# Patient Record
Sex: MALE | Race: OTHER | HISPANIC OR LATINO | URBAN - METROPOLITAN AREA
[De-identification: names, ages, dates, MRNs, and addresses within clinical notes are randomized per-mention and may not be internally consistent; named-entity substitution may affect disease eponyms.]

---

## 2020-02-20 ENCOUNTER — INPATIENT (INPATIENT)
Facility: HOSPITAL | Age: 80
LOS: 5 days | Discharge: NURSING, REHAB OR LONG-TERM CARE | End: 2020-02-26
Attending: INTERNAL MEDICINE | Admitting: INTERNAL MEDICINE
Payer: MEDICARE

## 2020-02-20 VITALS
HEART RATE: 94 BPM | HEIGHT: 66 IN | DIASTOLIC BLOOD PRESSURE: 59 MMHG | SYSTOLIC BLOOD PRESSURE: 110 MMHG | RESPIRATION RATE: 18 BRPM | OXYGEN SATURATION: 98 % | TEMPERATURE: 100 F | WEIGHT: 175.05 LBS

## 2020-02-20 DIAGNOSIS — R41.82 ALTERED MENTAL STATUS, UNSPECIFIED: ICD-10-CM

## 2020-02-20 DIAGNOSIS — J69.0 PNEUMONITIS DUE TO INHALATION OF FOOD AND VOMIT: ICD-10-CM

## 2020-02-20 DIAGNOSIS — I10 ESSENTIAL (PRIMARY) HYPERTENSION: ICD-10-CM

## 2020-02-20 DIAGNOSIS — E11.9 TYPE 2 DIABETES MELLITUS WITHOUT COMPLICATIONS: ICD-10-CM

## 2020-02-20 DIAGNOSIS — A41.9 SEPSIS, UNSPECIFIED ORGANISM: ICD-10-CM

## 2020-02-20 LAB
ALBUMIN SERPL ELPH-MCNC: 4.3 G/DL — SIGNIFICANT CHANGE UP (ref 3.3–5)
ALP SERPL-CCNC: 90 U/L — SIGNIFICANT CHANGE UP (ref 40–120)
ALT FLD-CCNC: 22 U/L — SIGNIFICANT CHANGE UP (ref 12–78)
ANION GAP SERPL CALC-SCNC: 8 MMOL/L — SIGNIFICANT CHANGE UP (ref 5–17)
APPEARANCE UR: CLEAR — SIGNIFICANT CHANGE UP
APTT BLD: 26.1 SEC — LOW (ref 28.5–37)
AST SERPL-CCNC: 15 U/L — SIGNIFICANT CHANGE UP (ref 15–37)
BASE EXCESS BLDA CALC-SCNC: -1 MMOL/L — SIGNIFICANT CHANGE UP (ref -2–2)
BASOPHILS # BLD AUTO: 0.04 K/UL — SIGNIFICANT CHANGE UP (ref 0–0.2)
BASOPHILS NFR BLD AUTO: 0.4 % — SIGNIFICANT CHANGE UP (ref 0–2)
BILIRUB SERPL-MCNC: 0.7 MG/DL — SIGNIFICANT CHANGE UP (ref 0.2–1.2)
BILIRUB UR-MCNC: NEGATIVE — SIGNIFICANT CHANGE UP
BLD GP AB SCN SERPL QL: SIGNIFICANT CHANGE UP
BLOOD GAS COMMENTS: SIGNIFICANT CHANGE UP
BLOOD GAS COMMENTS: SIGNIFICANT CHANGE UP
BLOOD GAS SOURCE: SIGNIFICANT CHANGE UP
BUN SERPL-MCNC: 19 MG/DL — SIGNIFICANT CHANGE UP (ref 7–23)
CALCIUM SERPL-MCNC: 9.6 MG/DL — SIGNIFICANT CHANGE UP (ref 8.5–10.1)
CHLORIDE SERPL-SCNC: 99 MMOL/L — SIGNIFICANT CHANGE UP (ref 96–108)
CO2 SERPL-SCNC: 26 MMOL/L — SIGNIFICANT CHANGE UP (ref 22–31)
COLOR SPEC: YELLOW — SIGNIFICANT CHANGE UP
CREAT SERPL-MCNC: 1.03 MG/DL — SIGNIFICANT CHANGE UP (ref 0.5–1.3)
DIFF PNL FLD: NEGATIVE — SIGNIFICANT CHANGE UP
EOSINOPHIL # BLD AUTO: 0.01 K/UL — SIGNIFICANT CHANGE UP (ref 0–0.5)
EOSINOPHIL NFR BLD AUTO: 0.1 % — SIGNIFICANT CHANGE UP (ref 0–6)
EPI CELLS # UR: ABNORMAL
GLUCOSE BLDC GLUCOMTR-MCNC: 132 MG/DL — HIGH (ref 70–99)
GLUCOSE SERPL-MCNC: 135 MG/DL — HIGH (ref 70–99)
GLUCOSE UR QL: 50 MG/DL
HCO3 BLDA-SCNC: 22 MMOL/L — SIGNIFICANT CHANGE UP (ref 21–29)
HCT VFR BLD CALC: 31.3 % — LOW (ref 39–50)
HGB BLD-MCNC: 9.9 G/DL — LOW (ref 13–17)
HOROWITZ INDEX BLDA+IHG-RTO: 28 — SIGNIFICANT CHANGE UP
IMM GRANULOCYTES NFR BLD AUTO: 0.4 % — SIGNIFICANT CHANGE UP (ref 0–1.5)
INR BLD: 1.24 RATIO — HIGH (ref 0.88–1.16)
KETONES UR-MCNC: NEGATIVE — SIGNIFICANT CHANGE UP
LACTATE SERPL-SCNC: 3.2 MMOL/L — HIGH (ref 0.7–2)
LACTATE SERPL-SCNC: 3.7 MMOL/L — HIGH (ref 0.7–2)
LEUKOCYTE ESTERASE UR-ACNC: NEGATIVE — SIGNIFICANT CHANGE UP
LYMPHOCYTES # BLD AUTO: 1.14 K/UL — SIGNIFICANT CHANGE UP (ref 1–3.3)
LYMPHOCYTES # BLD AUTO: 11.4 % — LOW (ref 13–44)
MCHC RBC-ENTMCNC: 27.5 PG — SIGNIFICANT CHANGE UP (ref 27–34)
MCHC RBC-ENTMCNC: 31.6 GM/DL — LOW (ref 32–36)
MCV RBC AUTO: 86.9 FL — SIGNIFICANT CHANGE UP (ref 80–100)
MONOCYTES # BLD AUTO: 1.14 K/UL — HIGH (ref 0–0.9)
MONOCYTES NFR BLD AUTO: 11.4 % — SIGNIFICANT CHANGE UP (ref 2–14)
NEUTROPHILS # BLD AUTO: 7.62 K/UL — HIGH (ref 1.8–7.4)
NEUTROPHILS NFR BLD AUTO: 76.3 % — SIGNIFICANT CHANGE UP (ref 43–77)
NITRITE UR-MCNC: NEGATIVE — SIGNIFICANT CHANGE UP
NRBC # BLD: 0 /100 WBCS — SIGNIFICANT CHANGE UP (ref 0–0)
NT-PROBNP SERPL-SCNC: 738 PG/ML — HIGH (ref 0–450)
PCO2 BLDA: 34 MMHG — SIGNIFICANT CHANGE UP (ref 32–46)
PH BLD: 7.43 — SIGNIFICANT CHANGE UP (ref 7.35–7.45)
PH UR: 8 — SIGNIFICANT CHANGE UP (ref 5–8)
PLATELET # BLD AUTO: 174 K/UL — SIGNIFICANT CHANGE UP (ref 150–400)
PO2 BLDA: 80 MMHG — SIGNIFICANT CHANGE UP (ref 74–108)
POTASSIUM SERPL-MCNC: 4.5 MMOL/L — SIGNIFICANT CHANGE UP (ref 3.5–5.3)
POTASSIUM SERPL-SCNC: 4.5 MMOL/L — SIGNIFICANT CHANGE UP (ref 3.5–5.3)
PROT SERPL-MCNC: 8.2 GM/DL — SIGNIFICANT CHANGE UP (ref 6–8.3)
PROT UR-MCNC: 30 MG/DL
PROTHROM AB SERPL-ACNC: 14 SEC — HIGH (ref 10–12.9)
RBC # BLD: 3.6 M/UL — LOW (ref 4.2–5.8)
RBC # FLD: 13.9 % — SIGNIFICANT CHANGE UP (ref 10.3–14.5)
RBC CASTS # UR COMP ASSIST: ABNORMAL /HPF (ref 0–4)
SAO2 % BLDA: 96 % — SIGNIFICANT CHANGE UP (ref 92–96)
SODIUM SERPL-SCNC: 133 MMOL/L — LOW (ref 135–145)
SP GR SPEC: 1.01 — SIGNIFICANT CHANGE UP (ref 1.01–1.02)
TROPONIN I SERPL-MCNC: <.015 NG/ML — SIGNIFICANT CHANGE UP (ref 0.01–0.04)
UROBILINOGEN FLD QL: NEGATIVE MG/DL — SIGNIFICANT CHANGE UP
WBC # BLD: 9.99 K/UL — SIGNIFICANT CHANGE UP (ref 3.8–10.5)
WBC # FLD AUTO: 9.99 K/UL — SIGNIFICANT CHANGE UP (ref 3.8–10.5)
WBC UR QL: SIGNIFICANT CHANGE UP

## 2020-02-20 PROCEDURE — 71250 CT THORAX DX C-: CPT | Mod: 26

## 2020-02-20 PROCEDURE — 99285 EMERGENCY DEPT VISIT HI MDM: CPT

## 2020-02-20 PROCEDURE — 70450 CT HEAD/BRAIN W/O DYE: CPT | Mod: 26

## 2020-02-20 PROCEDURE — 71045 X-RAY EXAM CHEST 1 VIEW: CPT | Mod: 26

## 2020-02-20 PROCEDURE — 93010 ELECTROCARDIOGRAM REPORT: CPT

## 2020-02-20 RX ORDER — HEPARIN SODIUM 5000 [USP'U]/ML
5000 INJECTION INTRAVENOUS; SUBCUTANEOUS EVERY 12 HOURS
Refills: 0 | Status: DISCONTINUED | OUTPATIENT
Start: 2020-02-20 | End: 2020-02-26

## 2020-02-20 RX ORDER — VANCOMYCIN HCL 1 G
VIAL (EA) INTRAVENOUS
Refills: 0 | Status: DISCONTINUED | OUTPATIENT
Start: 2020-02-21 | End: 2020-02-25

## 2020-02-20 RX ORDER — INSULIN LISPRO 100/ML
VIAL (ML) SUBCUTANEOUS AT BEDTIME
Refills: 0 | Status: DISCONTINUED | OUTPATIENT
Start: 2020-02-20 | End: 2020-02-26

## 2020-02-20 RX ORDER — SODIUM CHLORIDE 9 MG/ML
1000 INJECTION, SOLUTION INTRAVENOUS
Refills: 0 | Status: DISCONTINUED | OUTPATIENT
Start: 2020-02-20 | End: 2020-02-26

## 2020-02-20 RX ORDER — IPRATROPIUM/ALBUTEROL SULFATE 18-103MCG
3 AEROSOL WITH ADAPTER (GRAM) INHALATION EVERY 6 HOURS
Refills: 0 | Status: DISCONTINUED | OUTPATIENT
Start: 2020-02-20 | End: 2020-02-26

## 2020-02-20 RX ORDER — SODIUM CHLORIDE 9 MG/ML
1000 INJECTION INTRAMUSCULAR; INTRAVENOUS; SUBCUTANEOUS
Refills: 0 | Status: DISCONTINUED | OUTPATIENT
Start: 2020-02-20 | End: 2020-02-26

## 2020-02-20 RX ORDER — DEXTROSE 50 % IN WATER 50 %
25 SYRINGE (ML) INTRAVENOUS ONCE
Refills: 0 | Status: DISCONTINUED | OUTPATIENT
Start: 2020-02-20 | End: 2020-02-26

## 2020-02-20 RX ORDER — SODIUM CHLORIDE 9 MG/ML
1000 INJECTION INTRAMUSCULAR; INTRAVENOUS; SUBCUTANEOUS ONCE
Refills: 0 | Status: COMPLETED | OUTPATIENT
Start: 2020-02-20 | End: 2020-02-20

## 2020-02-20 RX ORDER — SODIUM CHLORIDE 9 MG/ML
2400 INJECTION INTRAMUSCULAR; INTRAVENOUS; SUBCUTANEOUS ONCE
Refills: 0 | Status: COMPLETED | OUTPATIENT
Start: 2020-02-20 | End: 2020-02-20

## 2020-02-20 RX ORDER — ACETAMINOPHEN 500 MG
650 TABLET ORAL EVERY 6 HOURS
Refills: 0 | Status: DISCONTINUED | OUTPATIENT
Start: 2020-02-20 | End: 2020-02-26

## 2020-02-20 RX ORDER — VANCOMYCIN HCL 1 G
1000 VIAL (EA) INTRAVENOUS ONCE
Refills: 0 | Status: COMPLETED | OUTPATIENT
Start: 2020-02-20 | End: 2020-02-20

## 2020-02-20 RX ORDER — ACETAMINOPHEN 500 MG
650 TABLET ORAL ONCE
Refills: 0 | Status: COMPLETED | OUTPATIENT
Start: 2020-02-20 | End: 2020-02-20

## 2020-02-20 RX ORDER — DEXTROSE 50 % IN WATER 50 %
15 SYRINGE (ML) INTRAVENOUS ONCE
Refills: 0 | Status: DISCONTINUED | OUTPATIENT
Start: 2020-02-20 | End: 2020-02-26

## 2020-02-20 RX ORDER — PIPERACILLIN AND TAZOBACTAM 4; .5 G/20ML; G/20ML
3.38 INJECTION, POWDER, LYOPHILIZED, FOR SOLUTION INTRAVENOUS EVERY 8 HOURS
Refills: 0 | Status: COMPLETED | OUTPATIENT
Start: 2020-02-20 | End: 2020-02-25

## 2020-02-20 RX ORDER — DEXTROSE 50 % IN WATER 50 %
12.5 SYRINGE (ML) INTRAVENOUS ONCE
Refills: 0 | Status: DISCONTINUED | OUTPATIENT
Start: 2020-02-20 | End: 2020-02-26

## 2020-02-20 RX ORDER — ALBUTEROL 90 UG/1
1 AEROSOL, METERED ORAL EVERY 4 HOURS
Refills: 0 | Status: DISCONTINUED | OUTPATIENT
Start: 2020-02-20 | End: 2020-02-20

## 2020-02-20 RX ORDER — SODIUM CHLORIDE 9 MG/ML
1000 INJECTION INTRAMUSCULAR; INTRAVENOUS; SUBCUTANEOUS ONCE
Refills: 0 | Status: DISCONTINUED | OUTPATIENT
Start: 2020-02-20 | End: 2020-02-20

## 2020-02-20 RX ORDER — GLUCAGON INJECTION, SOLUTION 0.5 MG/.1ML
1 INJECTION, SOLUTION SUBCUTANEOUS ONCE
Refills: 0 | Status: DISCONTINUED | OUTPATIENT
Start: 2020-02-20 | End: 2020-02-26

## 2020-02-20 RX ORDER — TIOTROPIUM BROMIDE 18 UG/1
1 CAPSULE ORAL; RESPIRATORY (INHALATION) DAILY
Refills: 0 | Status: DISCONTINUED | OUTPATIENT
Start: 2020-02-20 | End: 2020-02-26

## 2020-02-20 RX ORDER — PIPERACILLIN AND TAZOBACTAM 4; .5 G/20ML; G/20ML
3.38 INJECTION, POWDER, LYOPHILIZED, FOR SOLUTION INTRAVENOUS ONCE
Refills: 0 | Status: COMPLETED | OUTPATIENT
Start: 2020-02-20 | End: 2020-02-20

## 2020-02-20 RX ORDER — VANCOMYCIN HCL 1 G
1000 VIAL (EA) INTRAVENOUS EVERY 12 HOURS
Refills: 0 | Status: DISCONTINUED | OUTPATIENT
Start: 2020-02-21 | End: 2020-02-25

## 2020-02-20 RX ADMIN — Medication 650 MILLIGRAM(S): at 16:14

## 2020-02-20 RX ADMIN — SODIUM CHLORIDE 1000 MILLILITER(S): 9 INJECTION INTRAMUSCULAR; INTRAVENOUS; SUBCUTANEOUS at 16:59

## 2020-02-20 RX ADMIN — SODIUM CHLORIDE 2400 MILLILITER(S): 9 INJECTION INTRAMUSCULAR; INTRAVENOUS; SUBCUTANEOUS at 16:12

## 2020-02-20 RX ADMIN — Medication 250 MILLIGRAM(S): at 23:59

## 2020-02-20 RX ADMIN — Medication 650 MILLIGRAM(S): at 18:47

## 2020-02-20 RX ADMIN — SODIUM CHLORIDE 100 MILLILITER(S): 9 INJECTION INTRAMUSCULAR; INTRAVENOUS; SUBCUTANEOUS at 20:36

## 2020-02-20 RX ADMIN — Medication 650 MILLIGRAM(S): at 16:11

## 2020-02-20 RX ADMIN — PIPERACILLIN AND TAZOBACTAM 200 GRAM(S): 4; .5 INJECTION, POWDER, LYOPHILIZED, FOR SOLUTION INTRAVENOUS at 17:21

## 2020-02-20 NOTE — ED ADULT NURSE NOTE - OBJECTIVE STATEMENT
patient A&Ox2 to name and  , patient in no acute distress , patient cover in faeces in arrival and emesis all over the body , patient clean and  transfer to Ct scan with nurse , back from ct scan place on cardia monitor , continue to monitor

## 2020-02-20 NOTE — ED PROVIDER NOTE - CLINICAL SUMMARY MEDICAL DECISION MAKING FREE TEXT BOX
79M presenting w AMS. Further hx limited. Exam as above. Protecting airway at this time but will closely monitor. Concern for intracranial pathology vs aspiration event vs infectious process vs other. CT head, CT chest. Stat labs. Close re-assessment.   Onur Feliz MD, PGY3 Emergency Medicine

## 2020-02-20 NOTE — H&P ADULT - NSHPLABSRESULTS_GEN_ALL_CORE
LABS:                        9.9    9.99  )-----------( 174      ( 2020 16:06 )             31.3     0220    133<L>  |  99  |  19  ----------------------------<  135<H>  4.5   |  26  |  1.03    Ca    9.6      2020 16:06    TPro  8.2  /  Alb  4.3  /  TBili  0.7  /  DBili  x   /  AST  15  /  ALT  22  /  AlkPhos  90  0220    PT/INR - ( 2020 16:06 )   PT: 14.0 sec;   INR: 1.24 ratio         PTT - ( 2020 16:06 )  PTT:26.1 sec  Urinalysis Basic - ( 2020 16:55 )    Color: Yellow / Appearance: Clear / S.010 / pH: x  Gluc: x / Ketone: Negative  / Bili: Negative / Urobili: Negative mg/dL   Blood: x / Protein: 30 mg/dL / Nitrite: Negative   Leuk Esterase: Negative / RBC: 3-5 /HPF / WBC 3-5   Sq Epi: x / Non Sq Epi: Moderate / Bacteria: x    < from: Xray Chest 1 View-PORTABLE IMMEDIATE (20 @ 16:51) >      EXAM:  XR CHEST PORTABLE IMMED 1V                            PROCEDURE DATE:  2020          INTERPRETATION:  AP chest on 2020 4:27 PM. Patient is short of breath.    Heart is normal for projection.    The lung fields and pleural surfaces are unremarkable.    The chest is similar to 2016.    IMPRESSION: Negative unchanged chest.      < end of copied text >

## 2020-02-20 NOTE — ED PROVIDER NOTE - PHYSICAL EXAMINATION
General: Arousable to tactile stimuli, NOT alert or oriented, NOT verbal, NOT following commands. Vomitus on shirt.   HEENT: PERRL. No trauma/bruising noted to head or face. No rhinorrhea noted.   CV: Regular rate and rhythm, S1/S2, no murmurs/rubs/gallops noted on exam. No tenderness to palpation to chest wall.   Lungs: Clear to ascultation bilaterally, no wheezes/crackles/rales noted on exam. Equal chest wall excursion noted.   Abdomen: Soft, non tender, non distended, no guarding or rebound.   MSK: No gross deformities noted to extremities. Neck supple.   Neuro: Arousable to tactile stimuli, NOT alert or oriented, NOT verbal, NOT following commands. Further neuro exam limited due to AMS.   Extremities: No swelling or edema noted to extremities.   Skin: No rash noted on exam.

## 2020-02-20 NOTE — ED PROVIDER NOTE - PMH
Asthma    CVA (cerebral vascular accident)    Diabetes    High cholesterol    Hypertension    Syncope    Tobacco abuse, in remission

## 2020-02-20 NOTE — ED PROVIDER NOTE - ATTENDING CONTRIBUTION TO CARE
79 years old male by ems from the nursing home c/o pt chocked on the food with low oxygen sat before nasal cannula oxygen and pt's menal status is alerted. Here pt is lethargic and with slow responses with loud verbal stimuli Pt is unable to give detail hx. Pt has S1/S2 tachy, breath sounds are clear equal bilaterally abd is soft nontender to palp. Pt is confused. Agree with Dr. Feliz eval/treatment/dispo.

## 2020-02-20 NOTE — H&P ADULT - ASSESSMENT
IMPROVE VTE Individual Risk Assessment    RISK                                                                Points    [  ] Previous VTE                                                  3    [  ] Thrombophilia                                               2    [  ] Lower limb paralysis                                      2        (unable to hold up >15 seconds)      [  ] Current Cancer                                              2         (within 6 months)    [ x ] Immobilization > 24 hrs                                1    [  ] ICU/CCU stay > 24 hours                              1    [x  ] Age > 60                                                      1    IMPROVE VTE Score ____2_____    IMPROVE Score 0-1: Low Risk, No VTE prophylaxis required for most patients, encourage ambulation.   IMPROVE Score 2-3: At risk, pharmacologic VTE prophylaxis is indicated for most patients (in the absence of a contraindication)  IMPROVE Score > or = 4: High Risk, pharmacologic VTE prophylaxis is indicated for most patients (in the absence of a contraindication)

## 2020-02-20 NOTE — ED PROVIDER NOTE - OBJECTIVE STATEMENT
79M, per charting w med hx of CVA, asthma, DM, HTN HLD presents from nursing home with AMS. patient currently altered, not able to provide any history or any review of systems. Per triage note: "pt BIBA with c/o syncopal episode witnessed while seated, BS in the field 133, was seated eating unsure if patient chocked and post was AMS, pt responsive to tactile stimulation in triage, approximately 25 mins ago". Currently, patient is intermittently awake but NOT alert, NOT oriented, NOT following commands. Vomitus on shirt. Responsive to tactile stimuli. Protecting airway at this time.

## 2020-02-20 NOTE — H&P ADULT - NSHPPHYSICALEXAM_GEN_ALL_CORE
PHYSICAL EXAM:    GENERAL: NAD, well-groomed, well-developed  HEAD:  Atraumatic, Normocephalic  EYES: EOMI, PERRLA, conjunctiva and sclera clear    NECK: Supple, No JVD, Normal thyroid  NERVOUS SYSTEM:  Alert & Oriented  confused , ;  moves  all extr  CHEST/LUNG: no  bronchospam  HEART: Regular rate and rhythm; No murmurs, rubs, or gallops  ABDOMEN: Soft, Nontender, Nondistended; no  masses Bowel sounds present  EXTREMITIES:  + Peripheral Pulses, No clubbing, cyanosis, or edema  LYMPH: No lymphadenopathy noted   RECTAL: deferred    SKIN: No rashes or lesions

## 2020-02-20 NOTE — H&P ADULT - HISTORY OF PRESENT ILLNESS
79M, w med hx of CVA, asthma, DM, HTN HLD presents from nursing home with AMS.  syncopal episode witnessed while seated, BS in the field 133, was seated eating unsure if patient chocked and post was AMS,  evaluated  in  patient ER found  to be responsive to tactile stimulation in triage,  subsequently  patient is intermittently awake but NOT alert, NOT oriented, NOT following commands. Vomitus on shirt. Responsive to tactile stimuli.   w/u consistent  with  sepsis possible  aspiration pneumonia  admitted for  further w/u  and  treatment   discussed  with  er  MD  and Medical  PA  presently  patient  somnolent  easily  arouseable   confused follows  some  commands  close  to  baseline  cognition

## 2020-02-20 NOTE — H&P ADULT - NSICDXPASTMEDICALHX_GEN_ALL_CORE_FT
PAST MEDICAL HISTORY:  Asthma     CVA (cerebral vascular accident)     Diabetes     High cholesterol     Hypertension     Other anemia     Syncope     Tobacco abuse, in remission

## 2020-02-20 NOTE — ED ADULT TRIAGE NOTE - CHIEF COMPLAINT QUOTE
pt BIBA with c/o syncopal episode witnessed while seated, BS in the field 133, was seated eating unsure if patient chocked and post was AMS, pt responsive to tactile stimulation in triage, approximately 25 mins ago

## 2020-02-21 LAB
ANION GAP SERPL CALC-SCNC: 5 MMOL/L — SIGNIFICANT CHANGE UP (ref 5–17)
BUN SERPL-MCNC: 13 MG/DL — SIGNIFICANT CHANGE UP (ref 7–23)
CALCIUM SERPL-MCNC: 8.5 MG/DL — SIGNIFICANT CHANGE UP (ref 8.5–10.1)
CHLORIDE SERPL-SCNC: 105 MMOL/L — SIGNIFICANT CHANGE UP (ref 96–108)
CO2 SERPL-SCNC: 24 MMOL/L — SIGNIFICANT CHANGE UP (ref 22–31)
CREAT SERPL-MCNC: 0.78 MG/DL — SIGNIFICANT CHANGE UP (ref 0.5–1.3)
FLU A RESULT: DETECTED
FLU A RESULT: DETECTED
FLUAV AG NPH QL: DETECTED
FLUBV AG NPH QL: SIGNIFICANT CHANGE UP
GLUCOSE BLDC GLUCOMTR-MCNC: 113 MG/DL — HIGH (ref 70–99)
GLUCOSE BLDC GLUCOMTR-MCNC: 119 MG/DL — HIGH (ref 70–99)
GLUCOSE BLDC GLUCOMTR-MCNC: 124 MG/DL — HIGH (ref 70–99)
GLUCOSE BLDC GLUCOMTR-MCNC: 149 MG/DL — HIGH (ref 70–99)
GLUCOSE BLDC GLUCOMTR-MCNC: 209 MG/DL — HIGH (ref 70–99)
GLUCOSE SERPL-MCNC: 113 MG/DL — HIGH (ref 70–99)
HBA1C BLD-MCNC: 5.7 % — HIGH (ref 4–5.6)
HCT VFR BLD CALC: 26.1 % — LOW (ref 39–50)
HGB BLD-MCNC: 8.2 G/DL — LOW (ref 13–17)
LACTATE SERPL-SCNC: 1.5 MMOL/L — SIGNIFICANT CHANGE UP (ref 0.7–2)
MCHC RBC-ENTMCNC: 27.4 PG — SIGNIFICANT CHANGE UP (ref 27–34)
MCHC RBC-ENTMCNC: 31.4 GM/DL — LOW (ref 32–36)
MCV RBC AUTO: 87.3 FL — SIGNIFICANT CHANGE UP (ref 80–100)
NRBC # BLD: 0 /100 WBCS — SIGNIFICANT CHANGE UP (ref 0–0)
PLATELET # BLD AUTO: 151 K/UL — SIGNIFICANT CHANGE UP (ref 150–400)
POTASSIUM SERPL-MCNC: 3.4 MMOL/L — LOW (ref 3.5–5.3)
POTASSIUM SERPL-SCNC: 3.4 MMOL/L — LOW (ref 3.5–5.3)
PROCALCITONIN SERPL-MCNC: 0.43 NG/ML — HIGH (ref 0.02–0.1)
RBC # BLD: 2.99 M/UL — LOW (ref 4.2–5.8)
RBC # FLD: 14 % — SIGNIFICANT CHANGE UP (ref 10.3–14.5)
RSV RESULT: SIGNIFICANT CHANGE UP
RSV RNA RESP QL NAA+PROBE: SIGNIFICANT CHANGE UP
SODIUM SERPL-SCNC: 134 MMOL/L — LOW (ref 135–145)
WBC # BLD: 7.09 K/UL — SIGNIFICANT CHANGE UP (ref 3.8–10.5)
WBC # FLD AUTO: 7.09 K/UL — SIGNIFICANT CHANGE UP (ref 3.8–10.5)

## 2020-02-21 PROCEDURE — 71045 X-RAY EXAM CHEST 1 VIEW: CPT | Mod: 26

## 2020-02-21 RX ORDER — POTASSIUM CHLORIDE 20 MEQ
40 PACKET (EA) ORAL ONCE
Refills: 0 | Status: DISCONTINUED | OUTPATIENT
Start: 2020-02-21 | End: 2020-02-21

## 2020-02-21 RX ORDER — POTASSIUM CHLORIDE 20 MEQ
40 PACKET (EA) ORAL ONCE
Refills: 0 | Status: COMPLETED | OUTPATIENT
Start: 2020-02-21 | End: 2020-02-21

## 2020-02-21 RX ADMIN — Medication 75 MILLIGRAM(S): at 17:36

## 2020-02-21 RX ADMIN — Medication 3 MILLILITER(S): at 11:09

## 2020-02-21 RX ADMIN — PIPERACILLIN AND TAZOBACTAM 25 GRAM(S): 4; .5 INJECTION, POWDER, LYOPHILIZED, FOR SOLUTION INTRAVENOUS at 00:50

## 2020-02-21 RX ADMIN — HEPARIN SODIUM 5000 UNIT(S): 5000 INJECTION INTRAVENOUS; SUBCUTANEOUS at 17:36

## 2020-02-21 RX ADMIN — PIPERACILLIN AND TAZOBACTAM 25 GRAM(S): 4; .5 INJECTION, POWDER, LYOPHILIZED, FOR SOLUTION INTRAVENOUS at 21:16

## 2020-02-21 RX ADMIN — Medication 3 MILLILITER(S): at 17:11

## 2020-02-21 RX ADMIN — Medication 0: at 21:17

## 2020-02-21 RX ADMIN — SODIUM CHLORIDE 100 MILLILITER(S): 9 INJECTION INTRAMUSCULAR; INTRAVENOUS; SUBCUTANEOUS at 00:01

## 2020-02-21 RX ADMIN — Medication 650 MILLIGRAM(S): at 00:08

## 2020-02-21 RX ADMIN — Medication 3 MILLILITER(S): at 00:13

## 2020-02-21 RX ADMIN — Medication 3 MILLILITER(S): at 23:42

## 2020-02-21 RX ADMIN — Medication 250 MILLIGRAM(S): at 11:16

## 2020-02-21 RX ADMIN — Medication 650 MILLIGRAM(S): at 00:10

## 2020-02-21 RX ADMIN — HEPARIN SODIUM 5000 UNIT(S): 5000 INJECTION INTRAVENOUS; SUBCUTANEOUS at 06:13

## 2020-02-21 RX ADMIN — Medication 40 MILLIEQUIVALENT(S): at 11:54

## 2020-02-21 RX ADMIN — PIPERACILLIN AND TAZOBACTAM 25 GRAM(S): 4; .5 INJECTION, POWDER, LYOPHILIZED, FOR SOLUTION INTRAVENOUS at 06:13

## 2020-02-21 RX ADMIN — Medication 3 MILLILITER(S): at 06:13

## 2020-02-21 RX ADMIN — PIPERACILLIN AND TAZOBACTAM 25 GRAM(S): 4; .5 INJECTION, POWDER, LYOPHILIZED, FOR SOLUTION INTRAVENOUS at 13:55

## 2020-02-21 RX ADMIN — SODIUM CHLORIDE 100 MILLILITER(S): 9 INJECTION INTRAMUSCULAR; INTRAVENOUS; SUBCUTANEOUS at 11:54

## 2020-02-21 NOTE — PROGRESS NOTE ADULT - SUBJECTIVE AND OBJECTIVE BOX
INTERVAL HPI/OVERNIGHT EVENTS:        REVIEW OF SYSTEMS:  CONSTITUTIONAL:  no  complaints    NECK: No pain or stiffnes  RESPIRATORY: No SOB   CARDIOVASCULAR: No chest pain, palpitations, dizziness,   GASTROINTESTINAL: No abdominal pain. No nausea, vomiting,   NEUROLOGICAL: No headaches, no  blurry  vision no  dizziness  SKIN: No itching,   MUSCULOSKELETAL: No pain    MEDICATION:  acetaminophen  Suppository .. 650 milliGRAM(s) Rectal every 6 hours PRN  albuterol/ipratropium for Nebulization 3 milliLiter(s) Nebulizer every 6 hours  dextrose 40% Gel 15 Gram(s) Oral once PRN  dextrose 5%. 1000 milliLiter(s) IV Continuous <Continuous>  dextrose 50% Injectable 12.5 Gram(s) IV Push once  dextrose 50% Injectable 25 Gram(s) IV Push once  dextrose 50% Injectable 25 Gram(s) IV Push once  glucagon  Injectable 1 milliGRAM(s) IntraMuscular once PRN  heparin  Injectable 5000 Unit(s) SubCutaneous every 12 hours  insulin lispro (HumaLOG) corrective regimen sliding scale   SubCutaneous at bedtime  piperacillin/tazobactam IVPB.. 3.375 Gram(s) IV Intermittent every 8 hours  sodium chloride 0.9%. 1000 milliLiter(s) IV Continuous <Continuous>  tiotropium 18 MICROgram(s) Capsule 1 Capsule(s) Inhalation daily  vancomycin  IVPB      vancomycin  IVPB 1000 milliGRAM(s) IV Intermittent every 12 hours    Vital Signs Last 24 Hrs  T(C): 37.8 (2020 06:14), Max: 39.4 (2020 15:00)  T(F): 100 (2020 06:14), Max: 102.9 (2020 15:00)  HR: 80 (2020 06:37) (77 - 116)  BP: 137/75 (2020 05:29) (110/59 - 165/69)  BP(mean): --  RR: 18 (2020 05:29) (18 - 22)  SpO2: 98% (2020 06:37) (96% - 100%)    PHYSICAL EXAM:  GENERAL: NAD, well-groomed, well-developed  HEENT : Conjuntivae  clear sclerae anicteric  NECK: Supple, No JVD, Normal thyroid  NERVOUS SYSTEM:  Alert oriented   no  focal  deficits;   CHEST/LUNG: Clear    HEART: Regular rate and rhythm; No murmurs, rubs, or gallops  ABDOMEN: Soft, Nontender, Nondistended; Bowel sounds present  EXTREMITIES:  no  edema no  tenderness  SKIN: No rashes   LABS:                        8.2    7.09  )-----------( 151      ( 2020 06:18 )             26.1     02-21    134<L>  |  105  |  13  ----------------------------<  113<H>  3.4<L>   |  24  |  0.78    Ca    8.5      2020 06:18    TPro  8.2  /  Alb  4.3  /  TBili  0.7  /  DBili  x   /  AST  15  /  ALT  22  /  AlkPhos  90  02-20    PT/INR - ( 2020 16:06 )   PT: 14.0 sec;   INR: 1.24 ratio         PTT - ( 2020 16:06 )  PTT:26.1 sec  Urinalysis Basic - ( 2020 16:55 )    Color: Yellow / Appearance: Clear / S.010 / pH: x  Gluc: x / Ketone: Negative  / Bili: Negative / Urobili: Negative mg/dL   Blood: x / Protein: 30 mg/dL / Nitrite: Negative   Leuk Esterase: Negative / RBC: 3-5 /HPF / WBC 3-5   Sq Epi: x / Non Sq Epi: Moderate / Bacteria: x      CAPILLARY BLOOD GLUCOSE      POCT Blood Glucose.: 124 mg/dL (2020 07:28)  POCT Blood Glucose.: 119 mg/dL (2020 00:00)  POCT Blood Glucose.: 132 mg/dL (2020 14:57)      RADIOLOGY & ADDITIONAL TESTS:    Imaging reports  Personally Reviewed:  [ ] YES  [ ] NO    Consultant(s) Notes Reviewed:  [x ] YES  [ ] NO    Care Discussed with Consultants/Other Providers [x ] YES  [ ] NO  Problem/Plan - 1:  ·  Problem: Sepsis.  Plan: ivf  zosyn vanco.     Problem/Plan - 2:  ·  Problem: Aspiration pneumonia.  Plan: as  abovew  duoneb O2.     Problem/Plan - 3:  ·  Problem: Altered mental status.  Plan: observation swallow  eval.     Problem/Plan - 4:  ·  Problem: Hypertension.  Plan: observation.     Problem/Plan - 5:  ·  Problem: Diabetes.  Plan: insulin  coverage.

## 2020-02-21 NOTE — CONSULT NOTE ADULT - SUBJECTIVE AND OBJECTIVE BOX
Patient is a 79y old  Male who presents with a chief complaint of sepsis  aspiration pneumonia (2020 10:25)    HPI:  79M, w med hx of CVA, Asthma, DM, HTN HLD presents from nursing home with AMS.  syncopal episode witnessed , unsure if patient chocked and post was AMS,     Found to be febrile, CXR reported RLL Pneumonia.  Admitted with sepsis possible  aspiration pneumonia  . Pt is not good historian but says was an ex smoker.    PAST MEDICAL & SURGICAL HISTORY:  Other anemia  CVA (cerebral vascular accident)  Syncope  Tobacco abuse, in remission  Asthma  High cholesterol  Hypertension  Diabetes  No significant past surgical history    FAMILY HISTORY:  No pertinent family history in first degree relatives    SOCIAL HISTORY: BMI (kg/m2): 22.7 . ex smoker    Allergies  No Known Allergies    MEDICATIONS  (STANDING):  albuterol/ipratropium for Nebulization 3 milliLiter(s) Nebulizer every 6 hours  dextrose 5%. 1000 milliLiter(s) (50 mL/Hr) IV Continuous <Continuous>  dextrose 50% Injectable 12.5 Gram(s) IV Push once  dextrose 50% Injectable 25 Gram(s) IV Push once  dextrose 50% Injectable 25 Gram(s) IV Push once  heparin  Injectable 5000 Unit(s) SubCutaneous every 12 hours  insulin lispro (HumaLOG) corrective regimen sliding scale   SubCutaneous at bedtime  piperacillin/tazobactam IVPB.. 3.375 Gram(s) IV Intermittent every 8 hours  potassium chloride   Powder 40 milliEquivalent(s) Oral once  sodium chloride 0.9%. 1000 milliLiter(s) (100 mL/Hr) IV Continuous <Continuous>  tiotropium 18 MICROgram(s) Capsule 1 Capsule(s) Inhalation daily  vancomycin  IVPB      vancomycin  IVPB 1000 milliGRAM(s) IV Intermittent every 12 hours    MEDICATIONS  (PRN):  acetaminophen  Suppository .. 650 milliGRAM(s) Rectal every 6 hours PRN Temp greater or equal to 38C (100.4F), Moderate Pain (4 - 6)  dextrose 40% Gel 15 Gram(s) Oral once PRN Blood Glucose LESS THAN 70 milliGRAM(s)/deciliter  glucagon  Injectable 1 milliGRAM(s) IntraMuscular once PRN Glucose LESS THAN 70 milligrams/deciliter    REVIEW OF SYSTEMS:  not able to provide details.    MACRa & MIPS;   Vaccines - Influenza: not known and Pneumovax: not known  Tobacco: ex smoker  Blood Pressure Screening / Control of: 165/69  Current Medications Reviewed: yes    Vital Signs Last 24 Hrs  T(C): 37.1 (2020 11:00), Max: 39.4 (2020 15:00)  T(F): 98.8 (2020 11:00), Max: 102.9 (2020 15:00)  HR: 65 (2020 11:00) (65 - 116)  BP: 135/65 (2020 11:00) (110/59 - 165/69)  BP(mean): --  RR: 18 (2020 11:00) (18 - 22)  SpO2: 100% (2020 11:00) (96% - 100%)    PHYSICAL EXAM:  GEN:         Awake, responsive and comfortable.  HEENT:    Normal.    RESP:        decreased air entry.  CVS:             Regular rate and rhythm.   ABD:         Soft, non-tender, non-distended;   :             No costovertebral angle tenderness  SKIN:           Warm and dry.  EXTR:            No clubbing, cyanosis or edema  CNS:           responsive  PSYCH:        responsive    LABS:                        8.2    7.09  )-----------( 151      ( 2020 06:18 )             26.1     02-21    134<L>  |  105  |  13  ----------------------------<  113<H>  3.4<L>   |  24  |  0.78    Ca    8.5      2020 06:18    TPro  8.2  /  Alb  4.3  /  TBili  0.7  /  DBili  x   /  AST  15  /  ALT  22  /  AlkPhos  90  02-20    PT/INR - ( 2020 16:06 )   PT: 14.0 sec;   INR: 1.24 ratio      PTT - ( 2020 16:06 )  PTT:26.1 sec  02-20 @ 15:43  pH: 7.43  pCO2: 34  pO2: 80  SaO2: 96    Urinalysis Basic - ( 2020 16:55 )    Color: Yellow / Appearance: Clear / S.010 / pH: x  Gluc: x / Ketone: Negative  / Bili: Negative / Urobili: Negative mg/dL   Blood: x / Protein: 30 mg/dL / Nitrite: Negative   Leuk Esterase: Negative / RBC: 3-5 /HPF / WBC 3-5   Sq Epi: x / Non Sq Epi: Moderate / Bacteria: x    EKG: sinus rhythm    RADIOLOGY & ADDITIONAL STUDIES:  < from: Xray Chest 1 View- PORTABLE-Routine (20 @ 07:17) >  EXAM:  XR CHEST PORTABLE ROUTINE 1V                          PROCEDURE DATE:  2020      INTERPRETATION:  Cough. Pneumonia. Possible aspiration    A frontal chest film demonstrates a right lower lobe infiltrate not present on prior film 2020 markings are also increased.    Heart is mildly enlarged      A tortuous aorta is noted. .     The osseous structures appear intact intact.     IMPRESSION:  Right lower lobe infiltrate. Cardiomegaly.    DARLENE MOLINA M.D., ATTENDING RADIOLOGIST  This document has been electronically signed. 2020  9:11AM      ASSESSMENT AND PLAN:  ·	RLL Pneumonia.  ·	Possible Aspiration.  ·	Rule out Sepsis.  ·	Acute metabolic encephalopathy.  ·	Anemia.  ·	Hypokalemia.  ·	CVA history.  ·	HTN.  ·	DM    Continue antibiotics, follow cultures.  On IV fluids.  Replace potassium, follow electrolytes.  Aspiration precaution.

## 2020-02-21 NOTE — SWALLOW BEDSIDE ASSESSMENT ADULT - COMMENTS
CT Head No Cont 2/20/2020 Impression: 1. no acute findings.  Xray Chest 2/20/2020 IMPRESSION: Negative unchanged chest. CT Head No Cont 2/20/2020 Impression: 1. no acute findings.    Xray Chest 2/20/2020 IMPRESSION: Negative unchanged chest.

## 2020-02-21 NOTE — PATIENT PROFILE ADULT - VISION (WITH CORRECTIVE LENSES IF THE PATIENT USUALLY WEARS THEM):
Partially impaired: cannot see medication labels or newsprint, but can see obstacles in path, and the surrounding layout; can count fingers at arm's length/History of macular degeneration, glaucoma, and cataract.

## 2020-02-21 NOTE — SWALLOW BEDSIDE ASSESSMENT ADULT - H & P REVIEW
79M, w med hx of CVA, asthma, DM, HTN HLD presents from nursing home with AMS.  syncopal episode witnessed while seated, BS in the field 133, was seated eating unsure if patient chocked and post was AMS,  evaluated  in  patient ER found  to be responsive to tactile stimulation in triage,  subsequently  patient is intermittently awake but NOT alert, NOT oriented, NOT following commands. Vomitus on shirt. Responsive to tactile stimuli./yes

## 2020-02-21 NOTE — SWALLOW BEDSIDE ASSESSMENT ADULT - SWALLOW EVAL: DIAGNOSIS
pt. presents with oral pharyngeal phases WNL with noted baseline cough and multiple swallows with hard solid. pt. presents with oral pharyngeal phases WNL, no avert signs of aspiration.

## 2020-02-21 NOTE — SWALLOW BEDSIDE ASSESSMENT ADULT - SLP GENERAL OBSERVATIONS
pt. seen at bedside alert and oriented x1 name. pt. responded to simple autobiographical questions and followed one step directions. pt. communicated mainly using one word utterances. noted good speech intelligibility and vocal quality.

## 2020-02-22 LAB
ANION GAP SERPL CALC-SCNC: 8 MMOL/L — SIGNIFICANT CHANGE UP (ref 5–17)
BUN SERPL-MCNC: 6 MG/DL — LOW (ref 7–23)
CALCIUM SERPL-MCNC: 8.2 MG/DL — LOW (ref 8.5–10.1)
CHLORIDE SERPL-SCNC: 106 MMOL/L — SIGNIFICANT CHANGE UP (ref 96–108)
CO2 SERPL-SCNC: 23 MMOL/L — SIGNIFICANT CHANGE UP (ref 22–31)
CREAT SERPL-MCNC: 0.61 MG/DL — SIGNIFICANT CHANGE UP (ref 0.5–1.3)
CULTURE RESULTS: NO GROWTH — SIGNIFICANT CHANGE UP
GLUCOSE BLDC GLUCOMTR-MCNC: 152 MG/DL — HIGH (ref 70–99)
GLUCOSE BLDC GLUCOMTR-MCNC: 199 MG/DL — HIGH (ref 70–99)
GLUCOSE SERPL-MCNC: 160 MG/DL — HIGH (ref 70–99)
HCT VFR BLD CALC: 28.3 % — LOW (ref 39–50)
HGB BLD-MCNC: 9.1 G/DL — LOW (ref 13–17)
MAGNESIUM SERPL-MCNC: 1.5 MG/DL — LOW (ref 1.6–2.6)
MCHC RBC-ENTMCNC: 27.7 PG — SIGNIFICANT CHANGE UP (ref 27–34)
MCHC RBC-ENTMCNC: 32.2 GM/DL — SIGNIFICANT CHANGE UP (ref 32–36)
MCV RBC AUTO: 86 FL — SIGNIFICANT CHANGE UP (ref 80–100)
MRSA PCR RESULT.: SIGNIFICANT CHANGE UP
NRBC # BLD: 0 /100 WBCS — SIGNIFICANT CHANGE UP (ref 0–0)
PHOSPHATE SERPL-MCNC: 2.6 MG/DL — SIGNIFICANT CHANGE UP (ref 2.5–4.5)
PLATELET # BLD AUTO: 162 K/UL — SIGNIFICANT CHANGE UP (ref 150–400)
POTASSIUM SERPL-MCNC: 3.2 MMOL/L — LOW (ref 3.5–5.3)
POTASSIUM SERPL-SCNC: 3.2 MMOL/L — LOW (ref 3.5–5.3)
RBC # BLD: 3.29 M/UL — LOW (ref 4.2–5.8)
RBC # FLD: 13.7 % — SIGNIFICANT CHANGE UP (ref 10.3–14.5)
S AUREUS DNA NOSE QL NAA+PROBE: SIGNIFICANT CHANGE UP
SODIUM SERPL-SCNC: 137 MMOL/L — SIGNIFICANT CHANGE UP (ref 135–145)
SPECIMEN SOURCE: SIGNIFICANT CHANGE UP
WBC # BLD: 5.49 K/UL — SIGNIFICANT CHANGE UP (ref 3.8–10.5)
WBC # FLD AUTO: 5.49 K/UL — SIGNIFICANT CHANGE UP (ref 3.8–10.5)

## 2020-02-22 RX ORDER — POTASSIUM CHLORIDE 20 MEQ
40 PACKET (EA) ORAL ONCE
Refills: 0 | Status: DISCONTINUED | OUTPATIENT
Start: 2020-02-22 | End: 2020-02-22

## 2020-02-22 RX ORDER — MAGNESIUM SULFATE 500 MG/ML
1 VIAL (ML) INJECTION ONCE
Refills: 0 | Status: COMPLETED | OUTPATIENT
Start: 2020-02-22 | End: 2020-02-22

## 2020-02-22 RX ORDER — POTASSIUM CHLORIDE 20 MEQ
40 PACKET (EA) ORAL ONCE
Refills: 0 | Status: COMPLETED | OUTPATIENT
Start: 2020-02-22 | End: 2020-02-22

## 2020-02-22 RX ORDER — POTASSIUM CHLORIDE 20 MEQ
10 PACKET (EA) ORAL ONCE
Refills: 0 | Status: COMPLETED | OUTPATIENT
Start: 2020-02-22 | End: 2020-02-22

## 2020-02-22 RX ADMIN — PIPERACILLIN AND TAZOBACTAM 25 GRAM(S): 4; .5 INJECTION, POWDER, LYOPHILIZED, FOR SOLUTION INTRAVENOUS at 06:59

## 2020-02-22 RX ADMIN — Medication 75 MILLIGRAM(S): at 05:41

## 2020-02-22 RX ADMIN — Medication 3 MILLILITER(S): at 11:14

## 2020-02-22 RX ADMIN — HEPARIN SODIUM 5000 UNIT(S): 5000 INJECTION INTRAVENOUS; SUBCUTANEOUS at 05:43

## 2020-02-22 RX ADMIN — SODIUM CHLORIDE 100 MILLILITER(S): 9 INJECTION INTRAMUSCULAR; INTRAVENOUS; SUBCUTANEOUS at 11:10

## 2020-02-22 RX ADMIN — PIPERACILLIN AND TAZOBACTAM 25 GRAM(S): 4; .5 INJECTION, POWDER, LYOPHILIZED, FOR SOLUTION INTRAVENOUS at 22:41

## 2020-02-22 RX ADMIN — SODIUM CHLORIDE 100 MILLILITER(S): 9 INJECTION INTRAMUSCULAR; INTRAVENOUS; SUBCUTANEOUS at 01:13

## 2020-02-22 RX ADMIN — Medication 100 GRAM(S): at 16:02

## 2020-02-22 RX ADMIN — Medication 250 MILLIGRAM(S): at 05:41

## 2020-02-22 RX ADMIN — Medication 250 MILLIGRAM(S): at 01:13

## 2020-02-22 RX ADMIN — Medication 3 MILLILITER(S): at 05:56

## 2020-02-22 RX ADMIN — Medication 100 MILLIEQUIVALENT(S): at 11:05

## 2020-02-22 RX ADMIN — Medication 40 MILLIEQUIVALENT(S): at 11:06

## 2020-02-22 RX ADMIN — Medication 250 MILLIGRAM(S): at 18:53

## 2020-02-22 RX ADMIN — Medication 3 MILLILITER(S): at 23:50

## 2020-02-22 RX ADMIN — HEPARIN SODIUM 5000 UNIT(S): 5000 INJECTION INTRAVENOUS; SUBCUTANEOUS at 18:50

## 2020-02-22 RX ADMIN — Medication 3 MILLILITER(S): at 17:00

## 2020-02-22 RX ADMIN — PIPERACILLIN AND TAZOBACTAM 25 GRAM(S): 4; .5 INJECTION, POWDER, LYOPHILIZED, FOR SOLUTION INTRAVENOUS at 16:01

## 2020-02-22 RX ADMIN — Medication 75 MILLIGRAM(S): at 18:49

## 2020-02-22 NOTE — PROGRESS NOTE ADULT - SUBJECTIVE AND OBJECTIVE BOX
INTERVAL HPI:  79M, w med hx of CVA, Asthma, DM, HTN HLD presents from nursing home with AMS.  syncopal episode witnessed , unsure if patient chocked and post was AMS,     Found to be febrile, CXR reported RLL Pneumonia.  Admitted with sepsis possible  aspiration pneumonia  . Pt is not good historian but says was an ex smoker.  positive for Influenza.    OVERNIGHT EVENTS:  Awake, comfortable.    Vital Signs Last 24 Hrs  T(C): 36.4 (22 Feb 2020 17:05), Max: 36.9 (22 Feb 2020 05:16)  T(F): 97.6 (22 Feb 2020 17:05), Max: 98.4 (22 Feb 2020 05:16)  HR: 73 (22 Feb 2020 17:10) (55 - 86)  BP: 140/73 (22 Feb 2020 17:05) (122/73 - 149/79)  BP(mean): --  RR: 18 (22 Feb 2020 17:05) (18 - 18)  SpO2: 99% (22 Feb 2020 17:10) (96% - 100%)    PHYSICAL EXAM:  GEN:         Awake, responsive and comfortable.  HEENT:    Normal.    RESP:       no wheezing.  CVS:           Regular rate and rhythm.   ABD:         Soft, non-tender, non-distended;     MEDICATIONS  (STANDING):  albuterol/ipratropium for Nebulization 3 milliLiter(s) Nebulizer every 6 hours  dextrose 5%. 1000 milliLiter(s) (50 mL/Hr) IV Continuous <Continuous>  dextrose 50% Injectable 12.5 Gram(s) IV Push once  dextrose 50% Injectable 25 Gram(s) IV Push once  dextrose 50% Injectable 25 Gram(s) IV Push once  heparin  Injectable 5000 Unit(s) SubCutaneous every 12 hours  insulin lispro (HumaLOG) corrective regimen sliding scale   SubCutaneous at bedtime  oseltamivir 75 milliGRAM(s) Oral two times a day  piperacillin/tazobactam IVPB.. 3.375 Gram(s) IV Intermittent every 8 hours  sodium chloride 0.9%. 1000 milliLiter(s) (100 mL/Hr) IV Continuous <Continuous>  tiotropium 18 MICROgram(s) Capsule 1 Capsule(s) Inhalation daily  vancomycin  IVPB      vancomycin  IVPB 1000 milliGRAM(s) IV Intermittent every 12 hours    MEDICATIONS  (PRN):  acetaminophen  Suppository .. 650 milliGRAM(s) Rectal every 6 hours PRN Temp greater or equal to 38C (100.4F), Moderate Pain (4 - 6)  dextrose 40% Gel 15 Gram(s) Oral once PRN Blood Glucose LESS THAN 70 milliGRAM(s)/deciliter  glucagon  Injectable 1 milliGRAM(s) IntraMuscular once PRN Glucose LESS THAN 70 milligrams/deciliter    LABS:                        9.1    5.49  )-----------( 162      ( 22 Feb 2020 07:55 )             28.3     02-22    137  |  106  |  6<L>  ----------------------------<  160<H>  3.2<L>   |  23  |  0.61    Ca    8.2<L>      22 Feb 2020 07:55  Phos  2.6     02-22  Mg     1.5     02-22 02-20 @ 15:43  pH: 7.43  pCO2: 34  pO2: 80  SaO2: 96    ASSESSMENT AND PLAN:  ·	RLL Pneumonia.  ·	Influenza  ·	Possible Aspiration.  ·	Rule out Sepsis.  ·	Acute metabolic encephalopathy.  ·	Anemia.  ·	Hypokalemia.  ·	CVA history.  ·	HTN.  ·	DM    Continue Tamiflu and Singulair.  Reduce IV fluids.

## 2020-02-22 NOTE — PROGRESS NOTE ADULT - SUBJECTIVE AND OBJECTIVE BOX
INTERVAL HPI/OVERNIGHT EVENTS:    patient  nw  with  dg  fer  A    REVIEW OF SYSTEMS:  CONSTITUTIONAL:  no  complaints    NECK: No pain or stiffnes  RESPIRATORY: No SOB   CARDIOVASCULAR: No chest pain, palpitations, dizziness,   GASTROINTESTINAL: No abdominal pain. No nausea, vomiting,   NEUROLOGICAL: No headaches, no  blurry  vision no  dizziness  SKIN: No itching,   MUSCULOSKELETAL: No pain    MEDICATION:  acetaminophen  Suppository .. 650 milliGRAM(s) Rectal every 6 hours PRN  albuterol/ipratropium for Nebulization 3 milliLiter(s) Nebulizer every 6 hours  dextrose 40% Gel 15 Gram(s) Oral once PRN  dextrose 5%. 1000 milliLiter(s) IV Continuous <Continuous>  dextrose 50% Injectable 12.5 Gram(s) IV Push once  dextrose 50% Injectable 25 Gram(s) IV Push once  dextrose 50% Injectable 25 Gram(s) IV Push once  glucagon  Injectable 1 milliGRAM(s) IntraMuscular once PRN  heparin  Injectable 5000 Unit(s) SubCutaneous every 12 hours  insulin lispro (HumaLOG) corrective regimen sliding scale   SubCutaneous at bedtime  oseltamivir 75 milliGRAM(s) Oral two times a day  piperacillin/tazobactam IVPB.. 3.375 Gram(s) IV Intermittent every 8 hours  sodium chloride 0.9%. 1000 milliLiter(s) IV Continuous <Continuous>  tiotropium 18 MICROgram(s) Capsule 1 Capsule(s) Inhalation daily  vancomycin  IVPB      vancomycin  IVPB 1000 milliGRAM(s) IV Intermittent every 12 hours    Vital Signs Last 24 Hrs  T(C): 36.9 (2020 05:16), Max: 37.1 (2020 11:00)  T(F): 98.4 (2020 05:16), Max: 98.8 (2020 11:00)  HR: 77 (2020 05:58) (65 - 86)  BP: 149/79 (2020 05:16) (135/65 - 149/79)  BP(mean): --  RR: 18 (2020 05:16) (18 - 18)  SpO2: 100% (2020 05:58) (96% - 100%)    PHYSICAL EXAM:  GENERAL: NAD, well-groomed, well-developed  HEENT : Conjuntivae  clear sclerae anicteric  NECK: Supple, No JVD, Normal thyroid  NERVOUS SYSTEM:  Alert oriented x2  no  focal  deficits;   CHEST/LUNG:  ronhis   HEART: Regular rate and rhythm; No murmurs, rubs, or gallops  ABDOMEN: Soft, Nontender, Nondistended; Bowel sounds present  EXTREMITIES:  no  edema no  tenderness  SKIN: No rashes   LABS:                        9.1    5.49  )-----------( 162      ( 2020 07:55 )             28.3         137  |  106  |  6<L>  ----------------------------<  160<H>  3.2<L>   |  23  |  0.61    Ca    8.2<L>      2020 07:55    TPro  8.2  /  Alb  4.3  /  TBili  0.7  /  DBili  x   /  AST  15  /  ALT  22  /  AlkPhos  90  02-20    PT/INR - ( 2020 16:06 )   PT: 14.0 sec;   INR: 1.24 ratio         PTT - ( 2020 16:06 )  PTT:26.1 sec  Urinalysis Basic - ( 2020 16:55 )    Color: Yellow / Appearance: Clear / S.010 / pH: x  Gluc: x / Ketone: Negative  / Bili: Negative / Urobili: Negative mg/dL   Blood: x / Protein: 30 mg/dL / Nitrite: Negative   Leuk Esterase: Negative / RBC: 3-5 /HPF / WBC 3-5   Sq Epi: x / Non Sq Epi: Moderate / Bacteria: x      CAPILLARY BLOOD GLUCOSE      POCT Blood Glucose.: 209 mg/dL (2020 21:15)  POCT Blood Glucose.: 113 mg/dL (2020 16:11)  POCT Blood Glucose.: 149 mg/dL (2020 11:24)      RADIOLOGY & ADDITIONAL TESTS:    Imaging reports  Personally Reviewed:  [x ] YES  [ ] NO    Consultant(s) Notes Reviewed:  [x ] YES  [ ] NO    Care Discussed with Consultants/Other Providers [ x] YES  [ ] NO  Problem/Plan - 1:  ·  Problem: Sepsis.  Plan: ivf  zosyn vanco.     Problem/Plan - 2:  ·  Problem: Aspiration pneumonia.  Plan: as  abovew  duoneb O2.     Problem/Plan - 3:  ·  Problem: Altered mental status.  Plan: observation swallow  eval.     Problem/Plan - 4:  ·  Problem: Hypertension.  Plan: observation.     Problem/Plan - 5:  ·  Problem: Diabetes.  Plan: insulin  coverage.   INFUENZA  A  TAMIFLU

## 2020-02-23 LAB
ALBUMIN SERPL ELPH-MCNC: 2.9 G/DL — LOW (ref 3.3–5)
ALP SERPL-CCNC: 51 U/L — SIGNIFICANT CHANGE UP (ref 40–120)
ALT FLD-CCNC: 24 U/L — SIGNIFICANT CHANGE UP (ref 12–78)
ANION GAP SERPL CALC-SCNC: 5 MMOL/L — SIGNIFICANT CHANGE UP (ref 5–17)
AST SERPL-CCNC: 17 U/L — SIGNIFICANT CHANGE UP (ref 15–37)
BILIRUB SERPL-MCNC: 0.4 MG/DL — SIGNIFICANT CHANGE UP (ref 0.2–1.2)
BUN SERPL-MCNC: 8 MG/DL — SIGNIFICANT CHANGE UP (ref 7–23)
CALCIUM SERPL-MCNC: 8.2 MG/DL — LOW (ref 8.5–10.1)
CHLORIDE SERPL-SCNC: 107 MMOL/L — SIGNIFICANT CHANGE UP (ref 96–108)
CO2 SERPL-SCNC: 24 MMOL/L — SIGNIFICANT CHANGE UP (ref 22–31)
CREAT SERPL-MCNC: 0.69 MG/DL — SIGNIFICANT CHANGE UP (ref 0.5–1.3)
GLUCOSE BLDC GLUCOMTR-MCNC: 173 MG/DL — HIGH (ref 70–99)
GLUCOSE BLDC GLUCOMTR-MCNC: 264 MG/DL — HIGH (ref 70–99)
GLUCOSE BLDC GLUCOMTR-MCNC: 296 MG/DL — HIGH (ref 70–99)
GLUCOSE SERPL-MCNC: 150 MG/DL — HIGH (ref 70–99)
HCT VFR BLD CALC: 25 % — LOW (ref 39–50)
HGB BLD-MCNC: 8 G/DL — LOW (ref 13–17)
MCHC RBC-ENTMCNC: 27.3 PG — SIGNIFICANT CHANGE UP (ref 27–34)
MCHC RBC-ENTMCNC: 32 GM/DL — SIGNIFICANT CHANGE UP (ref 32–36)
MCV RBC AUTO: 85.3 FL — SIGNIFICANT CHANGE UP (ref 80–100)
NRBC # BLD: 0 /100 WBCS — SIGNIFICANT CHANGE UP (ref 0–0)
PLATELET # BLD AUTO: 155 K/UL — SIGNIFICANT CHANGE UP (ref 150–400)
POTASSIUM SERPL-MCNC: 3.6 MMOL/L — SIGNIFICANT CHANGE UP (ref 3.5–5.3)
POTASSIUM SERPL-SCNC: 3.6 MMOL/L — SIGNIFICANT CHANGE UP (ref 3.5–5.3)
PROT SERPL-MCNC: 5.8 GM/DL — LOW (ref 6–8.3)
RBC # BLD: 2.93 M/UL — LOW (ref 4.2–5.8)
RBC # FLD: 13.8 % — SIGNIFICANT CHANGE UP (ref 10.3–14.5)
SODIUM SERPL-SCNC: 136 MMOL/L — SIGNIFICANT CHANGE UP (ref 135–145)
WBC # BLD: 3.42 K/UL — LOW (ref 3.8–10.5)
WBC # FLD AUTO: 3.42 K/UL — LOW (ref 3.8–10.5)

## 2020-02-23 RX ADMIN — PIPERACILLIN AND TAZOBACTAM 25 GRAM(S): 4; .5 INJECTION, POWDER, LYOPHILIZED, FOR SOLUTION INTRAVENOUS at 21:21

## 2020-02-23 RX ADMIN — Medication 3 MILLILITER(S): at 17:12

## 2020-02-23 RX ADMIN — Medication 250 MILLIGRAM(S): at 05:12

## 2020-02-23 RX ADMIN — Medication 3 MILLILITER(S): at 11:00

## 2020-02-23 RX ADMIN — Medication 1: at 21:24

## 2020-02-23 RX ADMIN — HEPARIN SODIUM 5000 UNIT(S): 5000 INJECTION INTRAVENOUS; SUBCUTANEOUS at 05:13

## 2020-02-23 RX ADMIN — SODIUM CHLORIDE 60 MILLILITER(S): 9 INJECTION INTRAMUSCULAR; INTRAVENOUS; SUBCUTANEOUS at 15:06

## 2020-02-23 RX ADMIN — HEPARIN SODIUM 5000 UNIT(S): 5000 INJECTION INTRAVENOUS; SUBCUTANEOUS at 17:33

## 2020-02-23 RX ADMIN — PIPERACILLIN AND TAZOBACTAM 25 GRAM(S): 4; .5 INJECTION, POWDER, LYOPHILIZED, FOR SOLUTION INTRAVENOUS at 05:12

## 2020-02-23 RX ADMIN — Medication 3 MILLILITER(S): at 06:05

## 2020-02-23 RX ADMIN — PIPERACILLIN AND TAZOBACTAM 25 GRAM(S): 4; .5 INJECTION, POWDER, LYOPHILIZED, FOR SOLUTION INTRAVENOUS at 15:09

## 2020-02-23 RX ADMIN — Medication 75 MILLIGRAM(S): at 05:12

## 2020-02-23 RX ADMIN — Medication 250 MILLIGRAM(S): at 17:32

## 2020-02-23 RX ADMIN — Medication 75 MILLIGRAM(S): at 17:33

## 2020-02-23 NOTE — PROGRESS NOTE ADULT - SUBJECTIVE AND OBJECTIVE BOX
INTERVAL HPI/OVERNIGHT EVENTS:        REVIEW OF SYSTEMS:  CONSTITUTIONAL:  no  complaints    NECK: No pain or stiffnes  RESPIRATORY: No SOB   CARDIOVASCULAR: No chest pain, palpitations, dizziness,   GASTROINTESTINAL: No abdominal pain. No nausea, vomiting,   NEUROLOGICAL: No headaches, no  blurry  vision no  dizziness  SKIN: No itching,   MUSCULOSKELETAL: No pain    MEDICATION:  acetaminophen  Suppository .. 650 milliGRAM(s) Rectal every 6 hours PRN  albuterol/ipratropium for Nebulization 3 milliLiter(s) Nebulizer every 6 hours  dextrose 40% Gel 15 Gram(s) Oral once PRN  dextrose 5%. 1000 milliLiter(s) IV Continuous <Continuous>  dextrose 50% Injectable 12.5 Gram(s) IV Push once  dextrose 50% Injectable 25 Gram(s) IV Push once  dextrose 50% Injectable 25 Gram(s) IV Push once  glucagon  Injectable 1 milliGRAM(s) IntraMuscular once PRN  heparin  Injectable 5000 Unit(s) SubCutaneous every 12 hours  insulin lispro (HumaLOG) corrective regimen sliding scale   SubCutaneous at bedtime  oseltamivir 75 milliGRAM(s) Oral two times a day  piperacillin/tazobactam IVPB.. 3.375 Gram(s) IV Intermittent every 8 hours  sodium chloride 0.9%. 1000 milliLiter(s) IV Continuous <Continuous>  tiotropium 18 MICROgram(s) Capsule 1 Capsule(s) Inhalation daily  vancomycin  IVPB      vancomycin  IVPB 1000 milliGRAM(s) IV Intermittent every 12 hours    Vital Signs Last 24 Hrs  T(C): 36.3 (23 Feb 2020 04:42), Max: 36.4 (22 Feb 2020 17:05)  T(F): 97.4 (23 Feb 2020 04:42), Max: 97.6 (22 Feb 2020 17:05)  HR: 75 (23 Feb 2020 11:10) (72 - 77)  BP: 132/74 (23 Feb 2020 04:42) (121/66 - 140/73)  BP(mean): --  RR: 19 (23 Feb 2020 04:42) (18 - 19)  SpO2: 98% (23 Feb 2020 11:10) (97% - 100%)    PHYSICAL EXAM:  GENERAL: NAD, well-groomed, well-developed  HEENT : Conjuntivae  clear sclerae anicteric  NECK: Supple, No JVD, Normal thyroid  NERVOUS SYSTEM:  Alert oriented   no  focal  deficits;   CHEST/LUNG: Clear    HEART: Regular rate and rhythm; No murmurs, rubs, or gallops  ABDOMEN: Soft, Nontender, Nondistended; Bowel sounds present  EXTREMITIES:  no  edema no  tenderness  SKIN: No rashes   LABS:                        8.0    3.42  )-----------( 155      ( 23 Feb 2020 06:40 )             25.0     02-23    136  |  107  |  8   ----------------------------<  150<H>  3.6   |  24  |  0.69    Ca    8.2<L>      23 Feb 2020 06:40  Phos  2.6     02-22  Mg     1.5     02-22    TPro  5.8<L>  /  Alb  2.9<L>  /  TBili  0.4  /  DBili  x   /  AST  17  /  ALT  24  /  AlkPhos  51  02-23        CAPILLARY BLOOD GLUCOSE      POCT Blood Glucose.: 173 mg/dL (23 Feb 2020 09:20)  POCT Blood Glucose.: 199 mg/dL (22 Feb 2020 22:42)      RADIOLOGY & ADDITIONAL TESTS:    Imaging reports  Personally Reviewed:  [x ] YES  [ ] NO    Consultant(s) Notes Reviewed:  [x ] YES  [ ] NO    Care Discussed with Consultants/Other Providers [x ] YES  [ ] NO  Problem/Plan - 1:  ·  Problem: Sepsis.  Plan: ivf  zosyn vanco.     Problem/Plan - 2:  ·  Problem: Aspiration pneumonia.  Plan: as  abovew  duoneb O2.     Problem/Plan - 3:  ·  Problem: Altered mental status.  Plan: observation swallow  eval.     Problem/Plan - 4:  ·  Problem: Hypertension.  Plan: observation.     Problem/Plan - 5:  ·  Problem: Diabetes.  Plan: insulin  coverage.   INFUENZA  A  TAMIFLU

## 2020-02-24 LAB
ANION GAP SERPL CALC-SCNC: 6 MMOL/L — SIGNIFICANT CHANGE UP (ref 5–17)
BUN SERPL-MCNC: 6 MG/DL — LOW (ref 7–23)
CALCIUM SERPL-MCNC: 8.4 MG/DL — LOW (ref 8.5–10.1)
CHLORIDE SERPL-SCNC: 108 MMOL/L — SIGNIFICANT CHANGE UP (ref 96–108)
CO2 SERPL-SCNC: 25 MMOL/L — SIGNIFICANT CHANGE UP (ref 22–31)
CREAT SERPL-MCNC: 0.67 MG/DL — SIGNIFICANT CHANGE UP (ref 0.5–1.3)
GLUCOSE BLDC GLUCOMTR-MCNC: 194 MG/DL — HIGH (ref 70–99)
GLUCOSE BLDC GLUCOMTR-MCNC: 234 MG/DL — HIGH (ref 70–99)
GLUCOSE BLDC GLUCOMTR-MCNC: 317 MG/DL — HIGH (ref 70–99)
GLUCOSE SERPL-MCNC: 184 MG/DL — HIGH (ref 70–99)
HCT VFR BLD CALC: 27.8 % — LOW (ref 39–50)
HGB BLD-MCNC: 8.9 G/DL — LOW (ref 13–17)
IRON SATN MFR SERPL: 41 % — SIGNIFICANT CHANGE UP (ref 16–55)
IRON SATN MFR SERPL: 95 UG/DL — SIGNIFICANT CHANGE UP (ref 45–165)
MAGNESIUM SERPL-MCNC: 1.6 MG/DL — SIGNIFICANT CHANGE UP (ref 1.6–2.6)
MCHC RBC-ENTMCNC: 27.1 PG — SIGNIFICANT CHANGE UP (ref 27–34)
MCHC RBC-ENTMCNC: 32 GM/DL — SIGNIFICANT CHANGE UP (ref 32–36)
MCV RBC AUTO: 84.5 FL — SIGNIFICANT CHANGE UP (ref 80–100)
NRBC # BLD: 0 /100 WBCS — SIGNIFICANT CHANGE UP (ref 0–0)
PLATELET # BLD AUTO: 209 K/UL — SIGNIFICANT CHANGE UP (ref 150–400)
POTASSIUM SERPL-MCNC: 3.4 MMOL/L — LOW (ref 3.5–5.3)
POTASSIUM SERPL-SCNC: 3.4 MMOL/L — LOW (ref 3.5–5.3)
RBC # BLD: 3.29 M/UL — LOW (ref 4.2–5.8)
RBC # FLD: 13.5 % — SIGNIFICANT CHANGE UP (ref 10.3–14.5)
SODIUM SERPL-SCNC: 139 MMOL/L — SIGNIFICANT CHANGE UP (ref 135–145)
TIBC SERPL-MCNC: 230 UG/DL — SIGNIFICANT CHANGE UP (ref 220–430)
UIBC SERPL-MCNC: 135 UG/DL — SIGNIFICANT CHANGE UP (ref 110–370)
VIT B12 SERPL-MCNC: 610 PG/ML — SIGNIFICANT CHANGE UP (ref 232–1245)
WBC # BLD: 3.77 K/UL — LOW (ref 3.8–10.5)
WBC # FLD AUTO: 3.77 K/UL — LOW (ref 3.8–10.5)

## 2020-02-24 RX ORDER — MAGNESIUM SULFATE 500 MG/ML
2 VIAL (ML) INJECTION ONCE
Refills: 0 | Status: COMPLETED | OUTPATIENT
Start: 2020-02-24 | End: 2020-02-24

## 2020-02-24 RX ORDER — POTASSIUM CHLORIDE 20 MEQ
40 PACKET (EA) ORAL ONCE
Refills: 0 | Status: COMPLETED | OUTPATIENT
Start: 2020-02-24 | End: 2020-02-24

## 2020-02-24 RX ADMIN — Medication 3 MILLILITER(S): at 23:10

## 2020-02-24 RX ADMIN — HEPARIN SODIUM 5000 UNIT(S): 5000 INJECTION INTRAVENOUS; SUBCUTANEOUS at 18:49

## 2020-02-24 RX ADMIN — Medication 75 MILLIGRAM(S): at 05:22

## 2020-02-24 RX ADMIN — Medication 3 MILLILITER(S): at 00:32

## 2020-02-24 RX ADMIN — Medication 40 MILLIEQUIVALENT(S): at 18:48

## 2020-02-24 RX ADMIN — Medication 3 MILLILITER(S): at 05:41

## 2020-02-24 RX ADMIN — PIPERACILLIN AND TAZOBACTAM 25 GRAM(S): 4; .5 INJECTION, POWDER, LYOPHILIZED, FOR SOLUTION INTRAVENOUS at 14:06

## 2020-02-24 RX ADMIN — PIPERACILLIN AND TAZOBACTAM 25 GRAM(S): 4; .5 INJECTION, POWDER, LYOPHILIZED, FOR SOLUTION INTRAVENOUS at 21:14

## 2020-02-24 RX ADMIN — Medication 250 MILLIGRAM(S): at 05:22

## 2020-02-24 RX ADMIN — Medication 50 GRAM(S): at 18:44

## 2020-02-24 RX ADMIN — Medication 3 MILLILITER(S): at 17:18

## 2020-02-24 RX ADMIN — Medication 2: at 21:14

## 2020-02-24 RX ADMIN — Medication 3 MILLILITER(S): at 11:31

## 2020-02-24 RX ADMIN — Medication 75 MILLIGRAM(S): at 18:49

## 2020-02-24 RX ADMIN — PIPERACILLIN AND TAZOBACTAM 25 GRAM(S): 4; .5 INJECTION, POWDER, LYOPHILIZED, FOR SOLUTION INTRAVENOUS at 05:23

## 2020-02-24 RX ADMIN — HEPARIN SODIUM 5000 UNIT(S): 5000 INJECTION INTRAVENOUS; SUBCUTANEOUS at 05:23

## 2020-02-24 RX ADMIN — Medication 250 MILLIGRAM(S): at 18:43

## 2020-02-24 RX ADMIN — SODIUM CHLORIDE 60 MILLILITER(S): 9 INJECTION INTRAMUSCULAR; INTRAVENOUS; SUBCUTANEOUS at 11:27

## 2020-02-24 NOTE — PHYSICAL THERAPY INITIAL EVALUATION ADULT - CRITERIA FOR SKILLED THERAPEUTIC INTERVENTIONS
impairments found/functional limitations in following categories/risk reduction/prevention functional limitations in following categories/predicted duration of therapy intervention/therapy frequency/risk reduction/prevention/rehab potential/impairments found

## 2020-02-24 NOTE — PHYSICAL THERAPY INITIAL EVALUATION ADULT - GAIT TRAINING, PT EVAL
Pt will improve ambulation to ~ 100 feet c Min assist using rolling walker within 3-4 weeks. Pt will improve ambulation to ~ 100 feet c CG/Min assist using rolling walker within 3-4 weeks.

## 2020-02-24 NOTE — PHYSICAL THERAPY INITIAL EVALUATION ADULT - NS ASR RISK AREAS PT EVAL
Problem: Occupational Therapy Goal  Goal: Occupational Therapy Goal  Goals to be met by: 12/17/2018     Patient will increase functional independence with ADLs by performing:    Feeding with Modified [place].  UE Dressing with Set-up Assistance.  LE Dressing with Minimal Assistance.  Grooming while seated with Supervision.  Toileting from bedside commode with Stand-by Assistance for hygiene and clothing management.   Bathing from  shower chair/bench with Stand-by Assistance.  Rolling to Right, Left with Supervision.   Supine to sit with Supervision.  Stand pivot transfers with Stand-by Assistance.  Toilet transfer to bedside commode with Stand-by Assistance.  Upper extremity exercise program 3 x 15 reps per handout, with independence.  Patient will perform a functional standing activity for 10 min with S in order to perform household tasks.    Outcome: Ongoing (interventions implemented as appropriate)  .       fall/cognitive impairment safety awareness/fall/cognitive impairment

## 2020-02-24 NOTE — PHYSICAL THERAPY INITIAL EVALUATION ADULT - TRANSFER TRAINING, PT EVAL
Pt will improve transfers to Independent within 3-4 weeks. Pt will improve transfers to [facility] x 1 within 3-4 weeks.

## 2020-02-24 NOTE — PROGRESS NOTE ADULT - SUBJECTIVE AND OBJECTIVE BOX
INTERVAL HPI/OVERNIGHT EVENTS:        REVIEW OF SYSTEMS:  CONSTITUTIONAL:  no  complaints    NECK: No pain or stiffnes  RESPIRATORY: No SOB   CARDIOVASCULAR: No chest pain, palpitations, dizziness,   GASTROINTESTINAL: No abdominal pain. No nausea, vomiting,   NEUROLOGICAL: No headaches, no  blurry  vision no  dizziness  SKIN: No itching,   MUSCULOSKELETAL: No pain    MEDICATION:  acetaminophen  Suppository .. 650 milliGRAM(s) Rectal every 6 hours PRN  albuterol/ipratropium for Nebulization 3 milliLiter(s) Nebulizer every 6 hours  dextrose 40% Gel 15 Gram(s) Oral once PRN  dextrose 5%. 1000 milliLiter(s) IV Continuous <Continuous>  dextrose 50% Injectable 12.5 Gram(s) IV Push once  dextrose 50% Injectable 25 Gram(s) IV Push once  dextrose 50% Injectable 25 Gram(s) IV Push once  glucagon  Injectable 1 milliGRAM(s) IntraMuscular once PRN  heparin  Injectable 5000 Unit(s) SubCutaneous every 12 hours  insulin lispro (HumaLOG) corrective regimen sliding scale   SubCutaneous at bedtime  oseltamivir 75 milliGRAM(s) Oral two times a day  piperacillin/tazobactam IVPB.. 3.375 Gram(s) IV Intermittent every 8 hours  sodium chloride 0.9%. 1000 milliLiter(s) IV Continuous <Continuous>  tiotropium 18 MICROgram(s) Capsule 1 Capsule(s) Inhalation daily  vancomycin  IVPB      vancomycin  IVPB 1000 milliGRAM(s) IV Intermittent every 12 hours    Vital Signs Last 24 Hrs  T(C): 36.1 (24 Feb 2020 04:59), Max: 36.1 (24 Feb 2020 04:59)  T(F): 96.9 (24 Feb 2020 04:59), Max: 96.9 (24 Feb 2020 04:59)  HR: 67 (24 Feb 2020 06:15) (67 - 79)  BP: 118/68 (24 Feb 2020 04:59) (118/68 - 154/86)  BP(mean): --  RR: 17 (24 Feb 2020 04:59) (16 - 20)  SpO2: 97% (24 Feb 2020 06:15) (97% - 100%)    PHYSICAL EXAM:  GENERAL: NAD, well-groomed, well-developed  HEENT : Conjuntivae  clear sclerae anicteric  NECK: Supple, No JVD, Normal thyroid  NERVOUS SYSTEM:  Alert oriented x2  no  focal  deficits;   CHEST/LUNG: Clear    HEART: Regular rate and rhythm; No murmurs, rubs, or gallops  ABDOMEN: Soft, Nontender, Nondistended; Bowel sounds present  EXTREMITIES:  no  edema no  tenderness  SKIN: No rashes   LABS:                        8.9    3.77  )-----------( 209      ( 24 Feb 2020 07:41 )             27.8     02-24    139  |  108  |  6<L>  ----------------------------<  184<H>  3.4<L>   |  25  |  0.67    Ca    8.4<L>      24 Feb 2020 07:41    TPro  5.8<L>  /  Alb  2.9<L>  /  TBili  0.4  /  DBili  x   /  AST  17  /  ALT  24  /  AlkPhos  51  02-23        CAPILLARY BLOOD GLUCOSE      POCT Blood Glucose.: 194 mg/dL (24 Feb 2020 07:56)  POCT Blood Glucose.: 264 mg/dL (23 Feb 2020 21:19)  POCT Blood Glucose.: 296 mg/dL (23 Feb 2020 17:42)      RADIOLOGY & ADDITIONAL TESTS:    Imaging reports  Personally Reviewed:  [x ] YES  [ ] NO    Consultant(s) Notes Reviewed:  [x ] YES  [ ] NO    Care Discussed with Consultants/Other Providers [x ] YES  [ ] NO  Problem/Plan - 1:  ·  Problem: Sepsis.  Plan: ivf  zosyn vanco.     Problem/Plan - 2:  ·  Problem: Aspiration pneumonia.  Plan: as  abovew  duoneb O2.     Problem/Plan - 3:  ·  Problem: Altered mental status.  Plan: observation swallow  eval.     Problem/Plan - 4:  ·  Problem: Hypertension.  Plan: observation.     Problem/Plan - 5:  ·  Problem: Diabetes.  Plan: insulin  coverage.   INFUENZA  A   complete  5 day  course of TAMIFLU

## 2020-02-24 NOTE — PHYSICAL THERAPY INITIAL EVALUATION ADULT - PERTINENT HX OF CURRENT PROBLEM, REHAB EVAL
Patient is a 78 y/o male admitted to [facility] due to sepsis, aspiration pneumonia.

## 2020-02-24 NOTE — PHYSICAL THERAPY INITIAL EVALUATION ADULT - GENERAL OBSERVATIONS, REHAB EVAL
Chart (EMR) reviewed. All events to date noted. Pt received supine c HOB elevated, NAD, + heplock, + cardiac monitor, + O2 via NC. A&Ox2, poor historian, confused but able to follow single step commands/questions. Chart (EMR) reviewed. All events to date noted. Pt received supine c HOB elevated, NAD, + IV intact, + O2 via NC. On droplet precaution. A&Ox2, poor historian, confused but able to follow single step commands/questions.

## 2020-02-24 NOTE — PHYSICAL THERAPY INITIAL EVALUATION ADULT - COORDINATION ASSESSED, REHAB EVAL
Diminished B UE finger to nose test; Intact heel to shin B LE/finger to nose/heel to shin finger to nose/heel to shin/Diminished B UE finger to nose test; Intact heel to shin B LE

## 2020-02-24 NOTE — PHYSICAL THERAPY INITIAL EVALUATION ADULT - BED MOBILITY TRAINING, PT EVAL
Pt will improve bed mobility to Independent within 3-4 weeks. Pt will improve bed mobility to Supervision within 3-4 weeks.

## 2020-02-24 NOTE — PHYSICAL THERAPY INITIAL EVALUATION ADULT - BALANCE TRAINING, PT EVAL
Pt will improve standing balance(S/D) to Normal to increase dynamic activities within 3-4 weeks. Pt will improve standing balance(S/D) to F+/F to increase dynamic activities within 3-4 weeks.

## 2020-02-24 NOTE — PHYSICAL THERAPY INITIAL EVALUATION ADULT - MANUAL MUSCLE TESTING RESULTS, REHAB EVAL
B shoulder flexion 4-/5; B LE grossly graded 3+/5/grossly assessed due to grossly assessed due to/B shoulder flexion 4-/5; B LE grossly graded 3+/5. (+) Right wrist drop

## 2020-02-24 NOTE — PHYSICAL THERAPY INITIAL EVALUATION ADULT - ADDITIONAL COMMENTS
Patient is a nursing home resident. As per patient chart, patient is on nursing program for ambulation 100 ft c assist and rolling walker, PROM program. Patient has a wrist splint for R wrist drop. Patient requires assistance for ADL's and functional activities. Patient is a nursing home resident. As per patient chart, patient is on nursing program for ambulation 100 ft c assist using rolling walker. Patient has a wrist splint for R wrist drop. Patient requires assistance for ADL's and functional activities.

## 2020-02-24 NOTE — PHYSICAL THERAPY INITIAL EVALUATION ADULT - BALANCE DISTURBANCE, IDENTIFIED IMPAIRMENT CONTRIBUTE, REHAB EVAL
decreased strength/impaired postural control impaired coordination/impaired postural control/decreased strength

## 2020-02-24 NOTE — PROGRESS NOTE ADULT - SUBJECTIVE AND OBJECTIVE BOX
INTERVAL HPI:  79M, w med hx of CVA, Asthma, DM, HTN HLD presents from nursing home with AMS.  syncopal episode witnessed , unsure if patient chocked and post was AMS,     Found to be febrile, CXR reported RLL Pneumonia.  Admitted with sepsis possible  aspiration pneumonia  . Pt is not good historian but says was an ex smoker.  positive for Influenza.    OVERNIGHT EVENTS:  Awake and comfortable in bed.    Vital Signs Last 24 Hrs  T(C): 36.1 (24 Feb 2020 10:57), Max: 36.1 (24 Feb 2020 04:59)  T(F): 96.9 (24 Feb 2020 10:57), Max: 96.9 (24 Feb 2020 04:59)  HR: 73 (24 Feb 2020 14:33) (67 - 79)  BP: 106/50 (24 Feb 2020 14:33) (106/50 - 144/69)  BP(mean): --  RR: 17 (24 Feb 2020 10:57) (16 - 17)  SpO2: 98% (24 Feb 2020 14:33) (97% - 99%)    PHYSICAL EXAM:  GEN:         Awake, responsive and comfortable.  HEENT:    Normal.    RESP:       no wheezing.  CVS:          Regular rate and rhythm.   ABD:         Soft, non-tender, non-distended;     MEDICATIONS  (STANDING):  albuterol/ipratropium for Nebulization 3 milliLiter(s) Nebulizer every 6 hours  dextrose 5%. 1000 milliLiter(s) (50 mL/Hr) IV Continuous <Continuous>  dextrose 50% Injectable 12.5 Gram(s) IV Push once  dextrose 50% Injectable 25 Gram(s) IV Push once  dextrose 50% Injectable 25 Gram(s) IV Push once  heparin  Injectable 5000 Unit(s) SubCutaneous every 12 hours  insulin lispro (HumaLOG) corrective regimen sliding scale   SubCutaneous at bedtime  oseltamivir 75 milliGRAM(s) Oral two times a day  piperacillin/tazobactam IVPB.. 3.375 Gram(s) IV Intermittent every 8 hours  sodium chloride 0.9%. 1000 milliLiter(s) (60 mL/Hr) IV Continuous <Continuous>  tiotropium 18 MICROgram(s) Capsule 1 Capsule(s) Inhalation daily  vancomycin  IVPB      vancomycin  IVPB 1000 milliGRAM(s) IV Intermittent every 12 hours    MEDICATIONS  (PRN):  acetaminophen  Suppository .. 650 milliGRAM(s) Rectal every 6 hours PRN Temp greater or equal to 38C (100.4F), Moderate Pain (4 - 6)  dextrose 40% Gel 15 Gram(s) Oral once PRN Blood Glucose LESS THAN 70 milliGRAM(s)/deciliter  glucagon  Injectable 1 milliGRAM(s) IntraMuscular once PRN Glucose LESS THAN 70 milligrams/deciliter    LABS:                        8.9    3.77  )-----------( 209      ( 24 Feb 2020 07:41 )             27.8     02-24    139  |  108  |  6<L>  ----------------------------<  184<H>  3.4<L>   |  25  |  0.67    Ca    8.4<L>      24 Feb 2020 07:41  Mg     1.6     02-24    TPro  5.8<L>  /  Alb  2.9<L>  /  TBili  0.4  /  DBili  x   /  AST  17  /  ALT  24  /  AlkPhos  51  02-23 02-20 @ 15:43  pH: 7.43  pCO2: 34  pO2: 80  SaO2: 96    ASSESSMENT AND PLAN:  ·	RLL Pneumonia.  ·	Influenza  ·	Possible Aspiration.  ·	Rule out Sepsis.  ·	Acute metabolic encephalopathy.  ·	Anemia.  ·	Hypokalemia.  ·	CVA history.  ·	HTN.  ·	DM    Clinically improving.  will repeat procalcitonin.  continue antibiotics.  Complete 5 days of Tamiflu

## 2020-02-24 NOTE — PHYSICAL THERAPY INITIAL EVALUATION ADULT - IMPAIRMENTS FOUND, PT EVAL
posture/aerobic capacity/endurance/cognitive impairment/gait, locomotion, and balance/muscle strength aerobic capacity/endurance/gait, locomotion, and balance/cognitive impairment/muscle strength/posture/poor safety awareness

## 2020-02-25 LAB
ANION GAP SERPL CALC-SCNC: 8 MMOL/L — SIGNIFICANT CHANGE UP (ref 5–17)
BUN SERPL-MCNC: 9 MG/DL — SIGNIFICANT CHANGE UP (ref 7–23)
CALCIUM SERPL-MCNC: 8.6 MG/DL — SIGNIFICANT CHANGE UP (ref 8.5–10.1)
CHLORIDE SERPL-SCNC: 109 MMOL/L — HIGH (ref 96–108)
CO2 SERPL-SCNC: 24 MMOL/L — SIGNIFICANT CHANGE UP (ref 22–31)
CREAT SERPL-MCNC: 0.76 MG/DL — SIGNIFICANT CHANGE UP (ref 0.5–1.3)
GLUCOSE BLDC GLUCOMTR-MCNC: 206 MG/DL — HIGH (ref 70–99)
GLUCOSE BLDC GLUCOMTR-MCNC: 253 MG/DL — HIGH (ref 70–99)
GLUCOSE BLDC GLUCOMTR-MCNC: 327 MG/DL — HIGH (ref 70–99)
GLUCOSE SERPL-MCNC: 161 MG/DL — HIGH (ref 70–99)
HCT VFR BLD CALC: 25.8 % — LOW (ref 39–50)
HGB BLD-MCNC: 8.2 G/DL — LOW (ref 13–17)
MCHC RBC-ENTMCNC: 27.2 PG — SIGNIFICANT CHANGE UP (ref 27–34)
MCHC RBC-ENTMCNC: 31.8 GM/DL — LOW (ref 32–36)
MCV RBC AUTO: 85.4 FL — SIGNIFICANT CHANGE UP (ref 80–100)
NRBC # BLD: 0 /100 WBCS — SIGNIFICANT CHANGE UP (ref 0–0)
PLATELET # BLD AUTO: 215 K/UL — SIGNIFICANT CHANGE UP (ref 150–400)
POTASSIUM SERPL-MCNC: 3.8 MMOL/L — SIGNIFICANT CHANGE UP (ref 3.5–5.3)
POTASSIUM SERPL-SCNC: 3.8 MMOL/L — SIGNIFICANT CHANGE UP (ref 3.5–5.3)
PROCALCITONIN SERPL-MCNC: 0.11 NG/ML — HIGH (ref 0.02–0.1)
RBC # BLD: 3.02 M/UL — LOW (ref 4.2–5.8)
RBC # FLD: 13.8 % — SIGNIFICANT CHANGE UP (ref 10.3–14.5)
SODIUM SERPL-SCNC: 141 MMOL/L — SIGNIFICANT CHANGE UP (ref 135–145)
WBC # BLD: 3.62 K/UL — LOW (ref 3.8–10.5)
WBC # FLD AUTO: 3.62 K/UL — LOW (ref 3.8–10.5)

## 2020-02-25 RX ORDER — INSULIN LISPRO 100/ML
VIAL (ML) SUBCUTANEOUS
Refills: 0 | Status: DISCONTINUED | OUTPATIENT
Start: 2020-02-25 | End: 2020-02-26

## 2020-02-25 RX ADMIN — Medication 3 MILLILITER(S): at 11:35

## 2020-02-25 RX ADMIN — Medication 8: at 16:54

## 2020-02-25 RX ADMIN — HEPARIN SODIUM 5000 UNIT(S): 5000 INJECTION INTRAVENOUS; SUBCUTANEOUS at 18:00

## 2020-02-25 RX ADMIN — Medication 3 MILLILITER(S): at 05:21

## 2020-02-25 RX ADMIN — Medication 75 MILLIGRAM(S): at 05:16

## 2020-02-25 RX ADMIN — PIPERACILLIN AND TAZOBACTAM 25 GRAM(S): 4; .5 INJECTION, POWDER, LYOPHILIZED, FOR SOLUTION INTRAVENOUS at 05:15

## 2020-02-25 RX ADMIN — HEPARIN SODIUM 5000 UNIT(S): 5000 INJECTION INTRAVENOUS; SUBCUTANEOUS at 05:15

## 2020-02-25 RX ADMIN — Medication 250 MILLIGRAM(S): at 05:15

## 2020-02-25 RX ADMIN — Medication 75 MILLIGRAM(S): at 18:00

## 2020-02-25 RX ADMIN — SODIUM CHLORIDE 60 MILLILITER(S): 9 INJECTION INTRAMUSCULAR; INTRAVENOUS; SUBCUTANEOUS at 12:57

## 2020-02-25 RX ADMIN — Medication 3 MILLILITER(S): at 23:22

## 2020-02-25 RX ADMIN — Medication 3 MILLILITER(S): at 17:11

## 2020-02-25 RX ADMIN — PIPERACILLIN AND TAZOBACTAM 25 GRAM(S): 4; .5 INJECTION, POWDER, LYOPHILIZED, FOR SOLUTION INTRAVENOUS at 14:17

## 2020-02-25 NOTE — PROGRESS NOTE ADULT - SUBJECTIVE AND OBJECTIVE BOX
INTERVAL HPI:   79M, w med hx of CVA, Asthma, DM, HTN HLD presents from nursing home with AMS.  syncopal episode witnessed , unsure if patient chocked and post was AMS,     Found to be febrile, CXR reported RLL Pneumonia.  Admitted with sepsis possible  aspiration pneumonia  . Pt is not good historian but says was an ex smoker.  positive for Influenza.    OVERNIGHT EVENTS:  Feels better.    Vital Signs Last 24 Hrs  T(C): 36.1 (25 Feb 2020 18:05), Max: 36.9 (25 Feb 2020 00:04)  T(F): 97 (25 Feb 2020 18:05), Max: 98.4 (25 Feb 2020 00:04)  HR: 82 (25 Feb 2020 18:38) (71 - 84)  BP: 125/75 (25 Feb 2020 18:05) (108/54 - 144/80)  BP(mean): --  RR: 19 (25 Feb 2020 18:05) (16 - 19)  SpO2: 97% (25 Feb 2020 18:38) (96% - 100%)    PHYSICAL EXAM:  GEN:         Awake, responsive and comfortable.  HEENT:    Normal.    RESP:        no wheezing.  CVS:           Regular rate and rhythm.   ABD:         Soft, non-tender, non-distended;     MEDICATIONS  (STANDING):  albuterol/ipratropium for Nebulization 3 milliLiter(s) Nebulizer every 6 hours  dextrose 5%. 1000 milliLiter(s) (50 mL/Hr) IV Continuous <Continuous>  dextrose 50% Injectable 12.5 Gram(s) IV Push once  dextrose 50% Injectable 25 Gram(s) IV Push once  dextrose 50% Injectable 25 Gram(s) IV Push once  heparin  Injectable 5000 Unit(s) SubCutaneous every 12 hours  insulin lispro (HumaLOG) corrective regimen sliding scale   SubCutaneous at bedtime  insulin lispro (HumaLOG) corrective regimen sliding scale   SubCutaneous three times a day before meals  oseltamivir 75 milliGRAM(s) Oral two times a day  sodium chloride 0.9%. 1000 milliLiter(s) (60 mL/Hr) IV Continuous <Continuous>  tiotropium 18 MICROgram(s) Capsule 1 Capsule(s) Inhalation daily    MEDICATIONS  (PRN):  acetaminophen  Suppository .. 650 milliGRAM(s) Rectal every 6 hours PRN Temp greater or equal to 38C (100.4F), Moderate Pain (4 - 6)  dextrose 40% Gel 15 Gram(s) Oral once PRN Blood Glucose LESS THAN 70 milliGRAM(s)/deciliter  glucagon  Injectable 1 milliGRAM(s) IntraMuscular once PRN Glucose LESS THAN 70 milligrams/deciliter    LABS:                        8.2    3.62  )-----------( 215      ( 25 Feb 2020 06:19 )             25.8     02-25    141  |  109<H>  |  9   ----------------------------<  161<H>  3.8   |  24  |  0.76    Ca    8.6      25 Feb 2020 06:19  Mg     1.6     02-24 02-20 @ 15:43  pH: 7.43  pCO2: 34  pO2: 80  SaO2: 96    ASSESSMENT AND PLAN:  ·	RLL Pneumonia.  ·	Influenza  ·	Possible Aspiration.  ·	Rule out Sepsis.  ·	Acute metabolic encephalopathy.  ·	Anemia.  ·	Hypokalemia.  ·	CVA history.  ·	HTN.  ·	DM    Clinically improving.  procalcitonin. normal,  continue antibiotics.  Complete 5 days of Tamiflu

## 2020-02-25 NOTE — PROGRESS NOTE ADULT - SUBJECTIVE AND OBJECTIVE BOX
INTERVAL HPI/OVERNIGHT EVENTS:        REVIEW OF SYSTEMS:  CONSTITUTIONAL:  no  complaints    NECK: No pain or stiffnes  RESPIRATORY: No SOB   CARDIOVASCULAR: No chest pain, palpitations, dizziness,   GASTROINTESTINAL: No abdominal pain. No nausea, vomiting,   NEUROLOGICAL: No headaches, no  blurry  vision no  dizziness  SKIN: No itching,   MUSCULOSKELETAL: No pain    MEDICATION:  acetaminophen  Suppository .. 650 milliGRAM(s) Rectal every 6 hours PRN  albuterol/ipratropium for Nebulization 3 milliLiter(s) Nebulizer every 6 hours  dextrose 40% Gel 15 Gram(s) Oral once PRN  dextrose 5%. 1000 milliLiter(s) IV Continuous <Continuous>  dextrose 50% Injectable 12.5 Gram(s) IV Push once  dextrose 50% Injectable 25 Gram(s) IV Push once  dextrose 50% Injectable 25 Gram(s) IV Push once  glucagon  Injectable 1 milliGRAM(s) IntraMuscular once PRN  heparin  Injectable 5000 Unit(s) SubCutaneous every 12 hours  insulin lispro (HumaLOG) corrective regimen sliding scale   SubCutaneous at bedtime  oseltamivir 75 milliGRAM(s) Oral two times a day  piperacillin/tazobactam IVPB.. 3.375 Gram(s) IV Intermittent every 8 hours  sodium chloride 0.9%. 1000 milliLiter(s) IV Continuous <Continuous>  tiotropium 18 MICROgram(s) Capsule 1 Capsule(s) Inhalation daily    Vital Signs Last 24 Hrs  T(C): 35.7 (25 Feb 2020 05:24), Max: 36.9 (25 Feb 2020 00:04)  T(F): 96.3 (25 Feb 2020 05:24), Max: 98.4 (25 Feb 2020 00:04)  HR: 71 (25 Feb 2020 08:50) (71 - 78)  BP: 139/66 (25 Feb 2020 08:50) (106/50 - 144/80)  BP(mean): --  RR: 18 (25 Feb 2020 05:24) (17 - 18)  SpO2: 99% (25 Feb 2020 08:50) (97% - 99%)    PHYSICAL EXAM:  GENERAL: NAD, well-groomed, well-developed  HEENT : Conjuntivae  clear sclerae anicteric  NECK: Supple, No JVD, Normal thyroid  NERVOUS SYSTEM:  Alert oriented x2  no  focal  deficits;   CHEST/LUNG: Clear    HEART: Regular rate and rhythm; No murmurs, rubs, or gallops  ABDOMEN: Soft, Nontender, Nondistended; Bowel sounds present  EXTREMITIES:  no  edema no  tenderness  SKIN: No rashes   LABS:                        8.2    3.62  )-----------( 215      ( 25 Feb 2020 06:19 )             25.8     02-25    141  |  109<H>  |  9   ----------------------------<  161<H>  3.8   |  24  |  0.76    Ca    8.6      25 Feb 2020 06:19  Mg     1.6     02-24          CAPILLARY BLOOD GLUCOSE      POCT Blood Glucose.: 206 mg/dL (25 Feb 2020 08:05)  POCT Blood Glucose.: 317 mg/dL (24 Feb 2020 21:11)  POCT Blood Glucose.: 234 mg/dL (24 Feb 2020 11:40)      RADIOLOGY & ADDITIONAL TESTS:    Imaging reports  Personally Reviewed:  [ ] YES  [ ] NO    Consultant(s) Notes Reviewed:  [x ] YES  [ ] NO    Care Discussed with Consultants/Other Providers [x ] YES  [ ] NO  Problem/Plan - 1:  ·  Problem: Sepsis.  Plan: ivf  zosyn  d/c  vanco  recheck  CXR     Problem/Plan - 2:  ·  Problem: Aspiration pneumonia.  Plan: as  abovew  duoneb O2.     Problem/Plan - 3:  ·  Problem: Altered mental status.  Plan: observation swallow  eval.     Problem/Plan - 4:  ·  Problem: Hypertension.  Plan: observation.     Problem/Plan - 5:  ·  Problem: Diabetes.  Plan: insulin  coverage.   CARRIE MARTINEZ   completed  5 day  course of TAMIFLU can  d/c  isolation

## 2020-02-26 ENCOUNTER — TRANSCRIPTION ENCOUNTER (OUTPATIENT)
Age: 80
End: 2020-02-26

## 2020-02-26 VITALS
SYSTOLIC BLOOD PRESSURE: 123 MMHG | TEMPERATURE: 98 F | HEART RATE: 75 BPM | DIASTOLIC BLOOD PRESSURE: 60 MMHG | RESPIRATION RATE: 16 BRPM | OXYGEN SATURATION: 100 %

## 2020-02-26 DIAGNOSIS — D64.9 ANEMIA, UNSPECIFIED: ICD-10-CM

## 2020-02-26 DIAGNOSIS — R41.89 OTHER SYMPTOMS AND SIGNS INVOLVING COGNITIVE FUNCTIONS AND AWARENESS: ICD-10-CM

## 2020-02-26 LAB
CULTURE RESULTS: SIGNIFICANT CHANGE UP
CULTURE RESULTS: SIGNIFICANT CHANGE UP
GLUCOSE BLDC GLUCOMTR-MCNC: 195 MG/DL — HIGH (ref 70–99)
GLUCOSE BLDC GLUCOMTR-MCNC: 338 MG/DL — HIGH (ref 70–99)
SPECIMEN SOURCE: SIGNIFICANT CHANGE UP
SPECIMEN SOURCE: SIGNIFICANT CHANGE UP

## 2020-02-26 RX ORDER — IPRATROPIUM/ALBUTEROL SULFATE 18-103MCG
3 AEROSOL WITH ADAPTER (GRAM) INHALATION
Qty: 0 | Refills: 0 | DISCHARGE
Start: 2020-02-26

## 2020-02-26 RX ADMIN — Medication 8: at 11:13

## 2020-02-26 RX ADMIN — Medication 3 MILLILITER(S): at 05:34

## 2020-02-26 RX ADMIN — Medication 2: at 08:00

## 2020-02-26 RX ADMIN — Medication 3 MILLILITER(S): at 11:07

## 2020-02-26 RX ADMIN — Medication 75 MILLIGRAM(S): at 06:48

## 2020-02-26 RX ADMIN — Medication 1 TABLET(S): at 11:17

## 2020-02-26 RX ADMIN — HEPARIN SODIUM 5000 UNIT(S): 5000 INJECTION INTRAVENOUS; SUBCUTANEOUS at 06:48

## 2020-02-26 NOTE — PROGRESS NOTE ADULT - SUBJECTIVE AND OBJECTIVE BOX
INTERVAL HPI/OVERNIGHT EVENTS:        REVIEW OF SYSTEMS:  CONSTITUTIONAL: feels  well  no  complaints    NECK: No pain or stiffnes  RESPIRATORY: No SOB   CARDIOVASCULAR: No chest pain, palpitations, dizziness,   GASTROINTESTINAL: No abdominal pain. No nausea, vomiting,   NEUROLOGICAL: No headaches, no  blurry  vision no  dizziness  SKIN: No itching,   MUSCULOSKELETAL: No pain    MEDICATION:  acetaminophen  Suppository .. 650 milliGRAM(s) Rectal every 6 hours PRN  albuterol/ipratropium for Nebulization 3 milliLiter(s) Nebulizer every 6 hours  dextrose 40% Gel 15 Gram(s) Oral once PRN  dextrose 5%. 1000 milliLiter(s) IV Continuous <Continuous>  dextrose 50% Injectable 12.5 Gram(s) IV Push once  dextrose 50% Injectable 25 Gram(s) IV Push once  dextrose 50% Injectable 25 Gram(s) IV Push once  glucagon  Injectable 1 milliGRAM(s) IntraMuscular once PRN  heparin  Injectable 5000 Unit(s) SubCutaneous every 12 hours  insulin lispro (HumaLOG) corrective regimen sliding scale   SubCutaneous at bedtime  insulin lispro (HumaLOG) corrective regimen sliding scale   SubCutaneous three times a day before meals  sodium chloride 0.9%. 1000 milliLiter(s) IV Continuous <Continuous>  tiotropium 18 MICROgram(s) Capsule 1 Capsule(s) Inhalation daily    Vital Signs Last 24 Hrs  T(C): 36.3 (26 Feb 2020 05:30), Max: 36.4 (26 Feb 2020 00:08)  T(F): 97.3 (26 Feb 2020 05:30), Max: 97.6 (26 Feb 2020 00:08)  HR: 69 (26 Feb 2020 05:30) (69 - 84)  BP: 113/55 (26 Feb 2020 05:30) (108/54 - 162/66)  BP(mean): --  RR: 18 (26 Feb 2020 05:30) (16 - 19)  SpO2: 95% (26 Feb 2020 05:30) (95% - 100%)    PHYSICAL EXAM:  GENERAL: NAD, well-groomed, well-developed  HEENT : Conjuntivae  clear sclerae anicteric  NECK: Supple, No JVD, Normal thyroid  NERVOUS SYSTEM:  Alert oriented x2  no  focal  deficits;   CHEST/LUNG: Clear    HEART: Regular rate and rhythm; No murmurs, rubs, or gallops  ABDOMEN: Soft, Nontender, Nondistended; Bowel sounds present  EXTREMITIES:  no  edema no  tenderness  SKIN: No rashes   LABS:                        8.2    3.62  )-----------( 215      ( 25 Feb 2020 06:19 )             25.8     02-25    141  |  109<H>  |  9   ----------------------------<  161<H>  3.8   |  24  |  0.76    Ca    8.6      25 Feb 2020 06:19          CAPILLARY BLOOD GLUCOSE      POCT Blood Glucose.: 195 mg/dL (26 Feb 2020 07:25)  POCT Blood Glucose.: 253 mg/dL (25 Feb 2020 23:17)  POCT Blood Glucose.: 327 mg/dL (25 Feb 2020 16:04)  POCT Blood Glucose.: 206 mg/dL (25 Feb 2020 08:05)      RADIOLOGY & ADDITIONAL TESTS:    Imaging reports  Personally Reviewed:  [x ] YES  [ ] NO    Consultant(s) Notes Reviewed:  [x YES  [ ] NO    Care Discussed with Consultants/Other Providers [x ] YES  [ ] NO  Problem/Plan - 1:  ·  Problem: Sepsis.  Plan:     Problem/Plan - 2:  ·  Problem: Aspiration pneumonia.  change to AB to Augmenntin    Problem/Plan - 3:  ·  Problem: Altered mental status.  Plan:  clincally  improved to  baseloine    Problem/Plan - 4:  ·  Problem: Hypertension.  Plan: observation.     Problem/Plan - 5:  ·  Problem: Diabetes.  Plan: insulin  coverage.   INFUENZA  A   completed  5 day  course of TAMIFLU can go  back  to  [facility]

## 2020-02-26 NOTE — DISCHARGE NOTE PROVIDER - NSDCCPCAREPLAN_GEN_ALL_CORE_FT
PRINCIPAL DISCHARGE DIAGNOSIS  Diagnosis: Sepsis due to pneumonia  Assessment and Plan of Treatment:       SECONDARY DISCHARGE DIAGNOSES  Diagnosis: Influenza A  Assessment and Plan of Treatment:     Diagnosis: Aspiration pneumonia  Assessment and Plan of Treatment: Aspiration pneumonia    Diagnosis: Altered mental status  Assessment and Plan of Treatment:

## 2020-02-26 NOTE — DISCHARGE NOTE PROVIDER - CARE PROVIDER_API CALL
Jose Locke)  [address] [place] [address]  Phone: [phone]  Fax: [phone]  Follow Up Time:

## 2020-02-26 NOTE — DISCHARGE NOTE NURSING/CASE MANAGEMENT/SOCIAL WORK - PATIENT PORTAL LINK FT
You can access the FollowMyHealth Patient Portal offered by [facility] by registering at the following website: [email or web address]. By joining [facility]’s FollowMyHealth portal, you will also be able to view your health information using other applications (apps) compatible with our system.

## 2020-02-26 NOTE — DISCHARGE NOTE PROVIDER - HOSPITAL COURSE
patient  [facility]  [facility]  [facility]  [facility]  sepsis with  good  clinical response      treated  with  tamiflu  for  Influenza A    Anemia  stable  c/w  with  chronic  disease  process will continue  to  observe  will  discuss  further  with  family    DM  controlled  with  insulin coverage

## 2020-02-26 NOTE — DISCHARGE NOTE PROVIDER - NSDCMRMEDTOKEN_GEN_ALL_CORE_FT
acetaminophen 325 mg oral tablet: 2 tab(s) orally every 6 hours, As needed, For Temp over 37.9 C (100.2 F)  amoxicillin-clavulanate 875 mg-125 mg oral tablet: 1 tab(s) orally 2 times a day for  one week  aspirin 81 mg oral delayed release capsule:  orally once a day  atorvastatin 40 mg oral tablet: 1 tab(s) orally once a day (at bedtime)  ipratropium-albuterol 0.5 mg-2.5 mg/3 mLinhalation solution: 3 milliliter(s) inhaled every 6 hours  melatonin 3 mg oral tablet: 1 tab(s) orally once a day  Tradjenta 5 mg oral tablet: 1 tab(s) orally once a day (at bedtime)

## 2020-03-03 DIAGNOSIS — E11.9 TYPE 2 DIABETES MELLITUS WITHOUT COMPLICATIONS: ICD-10-CM

## 2020-03-03 DIAGNOSIS — J18.9 PNEUMONIA, UNSPECIFIED ORGANISM: ICD-10-CM

## 2020-03-03 DIAGNOSIS — D64.9 ANEMIA, UNSPECIFIED: ICD-10-CM

## 2020-03-03 DIAGNOSIS — A41.9 SEPSIS, UNSPECIFIED ORGANISM: ICD-10-CM

## 2020-03-03 DIAGNOSIS — Z79.82 LONG TERM (CURRENT) USE OF ASPIRIN: ICD-10-CM

## 2020-03-03 DIAGNOSIS — Z86.73 PERSONAL HISTORY OF TRANSIENT ISCHEMIC ATTACK (TIA), AND CEREBRAL INFARCTION WITHOUT RESIDUAL DEFICITS: ICD-10-CM

## 2020-03-03 DIAGNOSIS — R41.82 ALTERED MENTAL STATUS, UNSPECIFIED: ICD-10-CM

## 2020-03-03 DIAGNOSIS — R55 SYNCOPE AND COLLAPSE: ICD-10-CM

## 2020-03-03 DIAGNOSIS — J45.909 UNSPECIFIED ASTHMA, UNCOMPLICATED: ICD-10-CM

## 2020-03-03 DIAGNOSIS — I10 ESSENTIAL (PRIMARY) HYPERTENSION: ICD-10-CM

## 2020-03-03 DIAGNOSIS — Z79.1 LONG TERM (CURRENT) USE OF NON-STEROIDAL ANTI-INFLAMMATORIES (NSAID): ICD-10-CM

## 2020-03-03 DIAGNOSIS — J69.0 PNEUMONITIS DUE TO INHALATION OF FOOD AND VOMIT: ICD-10-CM

## 2020-03-03 DIAGNOSIS — Z87.891 PERSONAL HISTORY OF NICOTINE DEPENDENCE: ICD-10-CM

## 2020-03-03 DIAGNOSIS — E78.5 HYPERLIPIDEMIA, UNSPECIFIED: ICD-10-CM

## 2020-03-03 DIAGNOSIS — J10.08 INFLUENZA DUE TO OTHER IDENTIFIED INFLUENZA VIRUS WITH OTHER SPECIFIED PNEUMONIA: ICD-10-CM

## 2020-03-03 DIAGNOSIS — E87.6 HYPOKALEMIA: ICD-10-CM

## 2020-03-03 DIAGNOSIS — G93.41 METABOLIC ENCEPHALOPATHY: ICD-10-CM

## 2022-05-09 NOTE — PATIENT PROFILE ADULT - NSPROHMGIMGMTSTRAT_GEN_A_NUR
Pressure injury stage 2 or greater
activity/incontinence garment/pad/medication therapy/bowel program/nutrition support

## 2023-05-08 NOTE — PATIENT PROFILE ADULT - BRADEN SENSORY
(3) slightly limited Advancement-Rotation Flap Text: The defect edges were debeveled with a #15c scalpel blade.  Given the location of the defect, shape of the defect and the proximity to free margins an advancement-rotation flap was deemed most appropriate.  Using a sterile surgical marker, an appropriate flap was drawn incorporating the defect and placing the expected incisions within the relaxed skin tension lines where possible. The area thus outlined was incised deep to adipose tissue with a #15c scalpel blade.  The skin margins were undermined to an appropriate distance in all directions utilizing iris scissors.

## 2024-01-11 NOTE — PROGRESS NOTE ADULT - SUBJECTIVE AND OBJECTIVE BOX
PT Discharge    Today's date: 2024  Patient name: Amish Pulido  : 1939  MRN: [id number]  Referring provider: Daniel Malone MD  Dx:   Encounter Diagnosis     ICD-10-CM    1. Tendonitis of shoulder, left  M77.8       2. Acute pain of left shoulder  M25.512             Start Time: 1430  Stop Time: 1515  Total time in clinic (min): 45 minutes    Assessment  Assessment details: Patient is a 83 y.o. male with medical diagnosis of left shoulder tendinitis and pain. Since last re-evaluation, patient demonstrates full L shoulder AROM, near full L shoulder strength compared to R side, and improved scapular stability. Functionally, he has been able to use his shop vac and lift heavy objects s/ pain. Still has some limitation c/ functional IR, but is independent c/ HEP to continue to stretch this. He demonstrates independence c/ HEP and has plan to join fitness program following DC from PT. He has met or partially met LTGs at this time. He is discharged from PT at this time, will contact us for further care as indicated. Patient agreeable c/ POC.  Impairments: abnormal or restricted ROM, activity intolerance, impaired physical strength, lacks appropriate home exercise program, pain with function, scapular dyskinesis, poor posture  and poor body mechanics    Goals  STG (3 weeks):  L Shoulder ROM WNL. Met.  Improve L SH ER Strength by 1/2 grade. Met.    LTG (6 weeks):   L Shoulder >=4+/5 Strength. Met.  L Scap Strength >=4+/5. Partially Met.  80% improvement in left shoulder pain. Met.  Patient will be independent with HEP in 6 weeks to allow independent management of condition.  Met.    Plan  Plan details: D/c patient to independent management c/ HEP.    Planned therapy interventions: home exercise program  Treatment plan discussed with: patient        Subjective Evaluation    History of Present Illness  Mechanism of injury: IE: Patient is a 83 y.o. male presenting with left shoulder pain. Symptoms began 3-4 weeks  INTERVAL HPI:  79M, w med hx of CVA, Asthma, DM, HTN HLD presents from nursing home with AMS.  syncopal episode witnessed , unsure if patient chocked and post was AMS,     Found to be febrile, CXR reported RLL Pneumonia.  Admitted with sepsis possible  aspiration pneumonia  . Pt is not good historian but says was an ex smoker.  positive for Influenza.      OVERNIGHT EVENTS:  Comfortable.    Vital Signs Last 24 Hrs  T(C): 35.6 (23 Feb 2020 17:15), Max: 36.3 (23 Feb 2020 00:16)  T(F): 96 (23 Feb 2020 17:15), Max: 97.4 (23 Feb 2020 04:42)  HR: 78 (23 Feb 2020 17:29) (72 - 79)  BP: 126/77 (23 Feb 2020 17:15) (121/66 - 154/86)  BP(mean): --  RR: 17 (23 Feb 2020 17:15) (17 - 20)  SpO2: 98% (23 Feb 2020 17:29) (97% - 100%)    PHYSICAL EXAM:  GEN:         Awake, responsive and comfortable.  HEENT:    Normal.    RESP:       no wheezing.  CVS:          Regular rate and rhythm.   ABD:         Soft, non-tender, non-distended;     MEDICATIONS  (STANDING):  albuterol/ipratropium for Nebulization 3 milliLiter(s) Nebulizer every 6 hours  dextrose 5%. 1000 milliLiter(s) (50 mL/Hr) IV Continuous <Continuous>  dextrose 50% Injectable 12.5 Gram(s) IV Push once  dextrose 50% Injectable 25 Gram(s) IV Push once  dextrose 50% Injectable 25 Gram(s) IV Push once  heparin  Injectable 5000 Unit(s) SubCutaneous every 12 hours  insulin lispro (HumaLOG) corrective regimen sliding scale   SubCutaneous at bedtime  oseltamivir 75 milliGRAM(s) Oral two times a day  piperacillin/tazobactam IVPB.. 3.375 Gram(s) IV Intermittent every 8 hours  sodium chloride 0.9%. 1000 milliLiter(s) (60 mL/Hr) IV Continuous <Continuous>  tiotropium 18 MICROgram(s) Capsule 1 Capsule(s) Inhalation daily  vancomycin  IVPB      vancomycin  IVPB 1000 milliGRAM(s) IV Intermittent every 12 hours    MEDICATIONS  (PRN):  acetaminophen  Suppository .. 650 milliGRAM(s) Rectal every 6 hours PRN Temp greater or equal to 38C (100.4F), Moderate Pain (4 - 6)  dextrose 40% Gel 15 Gram(s) Oral once PRN Blood Glucose LESS THAN 70 milliGRAM(s)/deciliter  glucagon  Injectable 1 milliGRAM(s) IntraMuscular once PRN Glucose LESS THAN 70 milligrams/deciliter    LABS:                        8.0    3.42  )-----------( 155      ( 23 Feb 2020 06:40 )             25.0     02-23    136  |  107  |  8   ----------------------------<  150<H>  3.6   |  24  |  0.69    Ca    8.2<L>      23 Feb 2020 06:40  Phos  2.6     02-22  Mg     1.5     02-22    TPro  5.8<L>  /  Alb  2.9<L>  /  TBili  0.4  /  DBili  x   /  AST  17  /  ALT  24  /  AlkPhos  51  02-23    02-20 @ 15:43  pH: 7.43  pCO2: 34  pO2: 80  SaO2: 96    ASSESSMENT AND PLAN:  ·	RLL Pneumonia.  ·	Influenza  ·	Possible Aspiration.  ·	Rule out Sepsis.  ·	Acute metabolic encephalopathy.  ·	Anemia.  ·	Hypokalemia.  ·	CVA history.  ·	HTN.  ·	DM    Clinically better.  Complete 5 days of Tamiflu.  On Empiric antibiotics. ago, was fly fishing and it started to bother him. Noticed it also was hurting c/ lifting up to 90*. Has been resting it and feels it has improved, but is fearful of returning to fly fishing and aggravating it again.  Was going to the gym several times a week, but has rested from that as well. TENS and ice at home help symptoms slightly. Patients primary goals for PT are to be able to decrease pain and return fly fishing and the gym.     UPDATE RE : Patient reports 30-40% improvement since beginning PT. L shoulder has more pain-free ROM. Able to get quart of milk out of refrigerator and bring his coffee to hip mouth without shoulder pain. Greater strength c/ biceps curls motions. Able to carry things. Still has pain c/ shoulder abduction c/ elbow flexed.     UPDATE RE : Patient reports 100% improvement since beginning PT. L shoulder mobility has returned besides the behind the back motion, but he feels independent c/ stretching of this. Strength c/ pushing and pulling things with his shoulder has improved and is returning.   Patient Goals  Patient goals for therapy: increased strength, decreased pain, return to sport/leisure activities and increased motion  Patient goal: return to fly fishing and gym  Pain  Current pain ratin  At best pain ratin  At worst pain ratin  Quality: dull ache  Relieving factors: ice  Aggravating factors: overhead activity and lifting  Progression: improved    Hand dominance: left          Objective      Observation:     -Scapular Stability: Stab, HABD, Scap  RE ()   -L: 5/5, 4+/5, 4/5   RE ()   -L: 4+/5, 4/5, 4/5  IE   -R: 5/5, 4+/5, 4+/5   -L: 4+/5, 4/5, 4-/5     TTP: just inferior to bicipital groove, anterior to deltoid insertion     Shoulder ROM (degrees):               IE (R/L) RE (, L)  RE (, L)  FLX:   145/140  145  150  ABD:   150/136 150  150  ER:     65/61  61  68   IR:      T7/T7  T7  T7     Shoulder Strength (MMT Grades):               IE  "(R/L) R (12/7, L) RE (1/11, L)  FLX:     4+/4  4+  5  ABD:    4+/4-  4  4+  ER:      4+/3+  4-  4+  IR:        5/4+  4+  5     Shoulder Special Tests (L):  Kandy's Bubba: (-)  Empty Can: (+)  Yergason: (-)        Precautions: None      Daily Treatment Diary:      Initial Evaluation Date: 11/09/23  Re-evaluation: 12/7/23  POC Expiration: 1/18/24  Compliance 12/14 12/19 12/21 12/26 12/28 1/2 1/4 1/9 1/11    Visit Number [id number] 13  14  15  16  17 18  19    Re-Eval              RE    Foto Captured Y                      Date 1/2 1/4 1/9 1/11 12/28   Manuals        Shoulder PROM KS KS KS KS KS   GH/Lateral Distraction        Posterior [place] KS Gr III KS Gr II KS Gr II KS Gr III KS Gr III   STM to L Biceps Tendon KS Infra, Supra, Biceps LH, Subscap KS Infra,Supra,Biceps, LH Infra,Supra,Biceps, LH KS KS Infra, Supra, Biceps LH KS Infra, Supra, Biceps LH, subscap                   Neuro Re-Ed        SA Punches   -     Push-up Plus 3/10ea Low Plinth nv 3x10ea 3/10ea nv   [place]: Row, Ext, HABD, 90/90 ER, Y 10# Row & Ext 2/15ea 10# Row & Ext 3/10ea 10# Row & Ext 3/10ea 10# Row & Ext 3/10ea 10# Row & Ext 2/15ea           Ther Ex        Pec Stretch         Sleeper Stretch 10\"/10 10\"/10 10\"x10 10\"/10 10\"/10   S/Lying ER & ABD 4#/1# 3/10ea 4#/2# 3/10ea 4#/0# 3x10ea 4#/2# 3/10ea 4#/1# 3/10ea   Cane IR  10\"/10 -     Standing TB Row & Ext   -     Shoulder FLX & Scap Painfree ROM 2# 3/10ea Pain-free ROM 3# 2/10ea Painfree ROM 1# 2/10ea Pain free ROM 3# 2/10ea Pain free ROM 2# 2/10ea +ABD   UBE: UE Muscular Endurance 2'/2' lvl 2.0 2'/2' lvl 2.0 2'/2' lvl 2.0 2'/2' Lvl 2.0 2'/2' lvl 1.8   Education                Ther Activity                        Modalities        CP                        "

## 2024-09-12 NOTE — H&P ADULT - PROBLEM SELECTOR PLAN 3
What Is The Reason For Today's Visit?: Full Body Skin Examination What Is The Reason For Today's Visit? (Being Monitored For X): surveillance against the recurrence of atypical nevi observation swallow  eval

## 2025-02-04 NOTE — PATIENT PROFILE ADULT - CAREGIVER RELATION TO PATIENT
[FreeTextEntry1] : MRI of the cervical spine taken @ [facility] on 7- showed marked straightening of the usual lordosis. There ois a disc bulge at the C2-3 level where disc material approximates the ventral thecal sac and is encroaching on the ventral subarachnoid space. At this level a contour abnormality of the thecal sac due to the annulus fibrosus of the disc appreciated. There is also cerebrospinal fluid displacement due to the combination of the annulus fibrosus in combination with the nucleus pulposus of the disc, encroaching on the thecal sac. There is a disc bulge at the C3-4 level where disc material approximates the ventral thecal sac and is encroaching on the ventral subarachnoid space. At the C4-5, there is as shallow central disc herniation encroaching on the thecal sac and effacing the ventral subarachnoid space, producing mass effect on the cervical cord. At C5-6, there is a shallow central disc herniation encroaching on the thecal sac and effacing the ventral subarachnoid space, producing mass effect on the cervical cord. At C6-7, there is a shallow central disc herniation encroaching on the thecal sac and effacing the ventral subarachnoid space, producing mass effect on the cervical cord. There is a posterior annular tear at the C4-5 level characteriszed by high signal focus on the long TR sagittal images.  daughter